# Patient Record
Sex: MALE | Race: OTHER | Employment: FULL TIME
[De-identification: names, ages, dates, MRNs, and addresses within clinical notes are randomized per-mention and may not be internally consistent; named-entity substitution may affect disease eponyms.]

---

## 2022-10-27 ENCOUNTER — OFFICE VISIT (OUTPATIENT)
Dept: FAMILY MEDICINE CLINIC | Facility: CLINIC | Age: 52
End: 2022-10-27

## 2022-10-27 VITALS
RESPIRATION RATE: 16 BRPM | OXYGEN SATURATION: 98 % | TEMPERATURE: 96.8 F | SYSTOLIC BLOOD PRESSURE: 110 MMHG | WEIGHT: 192 LBS | DIASTOLIC BLOOD PRESSURE: 68 MMHG | HEIGHT: 67 IN | BODY MASS INDEX: 30.13 KG/M2 | HEART RATE: 82 BPM

## 2022-10-27 DIAGNOSIS — E78.00 ELEVATED LDL CHOLESTEROL LEVEL: ICD-10-CM

## 2022-10-27 DIAGNOSIS — R73.09 ELEVATED RANDOM BLOOD GLUCOSE LEVEL: Primary | ICD-10-CM

## 2022-10-27 DIAGNOSIS — R71.8 ELEVATED RED BLOOD CELL COUNT: ICD-10-CM

## 2022-10-27 LAB
BASOPHILS # BLD: 0.1 K/UL (ref 0–0.2)
BASOPHILS NFR BLD: 2 % (ref 0–2)
DIFFERENTIAL METHOD BLD: ABNORMAL
EOSINOPHIL # BLD: 0.2 K/UL (ref 0–0.8)
EOSINOPHIL NFR BLD: 3 % (ref 0.5–7.8)
ERYTHROCYTE [DISTWIDTH] IN BLOOD BY AUTOMATED COUNT: 19 % (ref 11.9–14.6)
HCT VFR BLD AUTO: 42.5 % (ref 41.1–50.3)
HGB BLD-MCNC: 11.9 G/DL (ref 13.6–17.2)
IMM GRANULOCYTES # BLD AUTO: 0.1 K/UL (ref 0–0.5)
IMM GRANULOCYTES NFR BLD AUTO: 1 % (ref 0–5)
LYMPHOCYTES # BLD: 1.4 K/UL (ref 0.5–4.6)
LYMPHOCYTES NFR BLD: 24 % (ref 13–44)
MCH RBC QN AUTO: 19.2 PG (ref 26.1–32.9)
MCHC RBC AUTO-ENTMCNC: 28 G/DL (ref 31.4–35)
MCV RBC AUTO: 68.5 FL (ref 82–102)
MONOCYTES # BLD: 0.3 K/UL (ref 0.1–1.3)
MONOCYTES NFR BLD: 6 % (ref 4–12)
NEUTS SEG # BLD: 3.9 K/UL (ref 1.7–8.2)
NEUTS SEG NFR BLD: 66 % (ref 43–78)
NRBC # BLD: 0 K/UL (ref 0–0.2)
PLATELET # BLD AUTO: 628 K/UL (ref 150–450)
PMV BLD AUTO: 9.6 FL (ref 9.4–12.3)
RBC # BLD AUTO: 6.2 M/UL (ref 4.23–5.6)
WBC # BLD AUTO: 5.9 K/UL (ref 4.3–11.1)

## 2022-10-27 PROCEDURE — 99213 OFFICE O/P EST LOW 20 MIN: CPT | Performed by: NURSE PRACTITIONER

## 2022-10-27 ASSESSMENT — ENCOUNTER SYMPTOMS
SHORTNESS OF BREATH: 0
DIARRHEA: 0
COUGH: 0
CONSTIPATION: 0
WHEEZING: 0
VOMITING: 0
EYE DISCHARGE: 0
EYE PAIN: 0
BLOOD IN STOOL: 0
ABDOMINAL PAIN: 0
CHEST TIGHTNESS: 0
RHINORRHEA: 0

## 2022-10-27 ASSESSMENT — PATIENT HEALTH QUESTIONNAIRE - PHQ9
SUM OF ALL RESPONSES TO PHQ QUESTIONS 1-9: 0
SUM OF ALL RESPONSES TO PHQ QUESTIONS 1-9: 0
1. LITTLE INTEREST OR PLEASURE IN DOING THINGS: 0
SUM OF ALL RESPONSES TO PHQ QUESTIONS 1-9: 0
SUM OF ALL RESPONSES TO PHQ QUESTIONS 1-9: 0
2. FEELING DOWN, DEPRESSED OR HOPELESS: 0
SUM OF ALL RESPONSES TO PHQ9 QUESTIONS 1 & 2: 0

## 2022-10-27 NOTE — PROGRESS NOTES
Oneil Patel (:  1970) is a 45 y.o. male,Established patient, here for evaluation of the following chief complaint(s):  Depression and Labs Only         ASSESSMENT/PLAN:  1. Elevated random blood glucose level  -     Hemoglobin A1C; Future  2. Elevated LDL cholesterol level  -     Lipid Panel; Future  -     Comprehensive Metabolic Panel; Future  3. Elevated red blood cell count  -     CBC with Auto Differential; Future    Anticipatory guidance for age-seatbelts, avoid cell phone use in car (may use hands free), no texting while driving, avoid social drugs and tobacco, limit alcohol, fall safety, personal safety. Encourage healthy diet and exercise daily. Patient states he is eating well, healthy diet. Tries to make healthy choices. Return in about 6 months (around 2023) for labs and office visit. Subjective   SUBJECTIVE/OBJECTIVE:  Here today for his lab work. Needs to have everything tested as he is now donating blood every two months to keep his RBCs down. Works long hours framing houses. No formal exercise plan. Is watching diet. Is afraid he will have diabetes one day as it runs in his family. English is his seconPicmonic language but is doing well today without the --states he needs to practice his English more. Brother also with polycythemia. Denies any clotting issues. No bruising. No abdominal pain. No Known Allergies  No current outpatient medications on file. No current facility-administered medications for this visit. Review of Systems   Constitutional:  Negative for fatigue and fever. HENT:  Negative for congestion, hearing loss, postnasal drip and rhinorrhea. Eyes:  Negative for pain, discharge and visual disturbance. Respiratory:  Negative for cough, chest tightness, shortness of breath and wheezing. Cardiovascular:  Negative for chest pain, palpitations and leg swelling.    Gastrointestinal:  Negative for abdominal pain, blood in stool, constipation, diarrhea and vomiting. Endocrine: Negative for polydipsia, polyphagia and polyuria. Polycythemia. Is donating blood. Has not been turned down by [facility]. Goes every two months. Denies any signs of diabetes. Genitourinary:  Negative for difficulty urinating, frequency and urgency. Musculoskeletal:  Negative for arthralgias, gait problem and myalgias. Has very physical job--denies joint pain or myalgias. Skin:  Negative for rash. Neurological:  Negative for dizziness, tremors, seizures and headaches. Psychiatric/Behavioral:  Negative for decreased concentration and sleep disturbance. The patient is not nervous/anxious. /68   Pulse 82   Temp 96.8 °F (36 °C) (Tympanic)   Resp 16   Ht 5' 7\" (1.702 m)   Wt 192 lb (87.1 kg)   SpO2 98%   BMI 30.07 kg/m²       Objective   Physical Exam  Constitutional:       Appearance: Normal appearance. HENT:      Head: Normocephalic and atraumatic. Right Ear: Tympanic membrane, ear canal and external ear normal.      Left Ear: Tympanic membrane, ear canal and external ear normal.      Nose: Nose normal.      Mouth/Throat:      Mouth: Mucous membranes are moist.   Eyes:      Extraocular Movements: Extraocular movements intact. Conjunctiva/sclera: Conjunctivae normal.      Pupils: Pupils are equal, round, and reactive to light. Cardiovascular:      Rate and Rhythm: Normal rate and regular rhythm. Pulses: Normal pulses. Heart sounds: Normal heart sounds. No murmur heard. Pulmonary:      Effort: Pulmonary effort is normal.      Breath sounds: Normal breath sounds. Abdominal:      General: Bowel sounds are normal.   Musculoskeletal:         General: Normal range of motion. Cervical back: Normal range of motion. Skin:     General: Skin is warm and dry. Neurological:      General: No focal deficit present.       Mental Status: He is alert and oriented to person, place, and time.   Psychiatric:         Mood and Affect: Mood normal.         Behavior: Behavior normal.         Thought Content: Thought content normal.         Judgment: Judgment normal.      No data recorded  Body mass index is 30.07 kg/m². On this date 10/27/2022 I have spent 25 minutes reviewing previous notes, test results and face to face with the patient discussing the diagnosis and importance of compliance with the treatment plan as well as documenting on the day of the visit. An electronic signature was used to authenticate this note.     --RIOS Araujo - CNP

## 2022-10-28 LAB
ALBUMIN SERPL-MCNC: 4.1 G/DL (ref 3.5–5)
ALBUMIN/GLOB SERPL: 1.4 {RATIO} (ref 0.4–1.6)
ALP SERPL-CCNC: 77 U/L (ref 50–136)
ALT SERPL-CCNC: 34 U/L (ref 12–65)
ANION GAP SERPL CALC-SCNC: 6 MMOL/L (ref 2–11)
AST SERPL-CCNC: 20 U/L (ref 15–37)
BILIRUB SERPL-MCNC: 0.5 MG/DL (ref 0.2–1.1)
BUN SERPL-MCNC: 14 MG/DL (ref 6–23)
CALCIUM SERPL-MCNC: 9.3 MG/DL (ref 8.3–10.4)
CHLORIDE SERPL-SCNC: 107 MMOL/L (ref 101–110)
CHOLEST SERPL-MCNC: 159 MG/DL
CO2 SERPL-SCNC: 26 MMOL/L (ref 21–32)
CREAT SERPL-MCNC: 0.8 MG/DL (ref 0.8–1.5)
EST. AVERAGE GLUCOSE BLD GHB EST-MCNC: 117 MG/DL
GLOBULIN SER CALC-MCNC: 3 G/DL (ref 2.8–4.5)
GLUCOSE SERPL-MCNC: 96 MG/DL (ref 65–100)
HBA1C MFR BLD: 5.7 % (ref 4.8–5.6)
HDLC SERPL-MCNC: 45 MG/DL (ref 40–60)
HDLC SERPL: 3.5 {RATIO}
LDLC SERPL CALC-MCNC: 85.6 MG/DL
POTASSIUM SERPL-SCNC: 4.4 MMOL/L (ref 3.5–5.1)
PROT SERPL-MCNC: 7.1 G/DL (ref 6.3–8.2)
SODIUM SERPL-SCNC: 139 MMOL/L (ref 133–143)
TRIGL SERPL-MCNC: 142 MG/DL (ref 35–150)
VLDLC SERPL CALC-MCNC: 28.4 MG/DL (ref 6–23)

## 2022-11-01 NOTE — PROGRESS NOTES
Referred to hematologist.  Would like to just make sure that there is no other treatment needed for his polycythemia.   Cecily Beltran

## 2022-12-02 ENCOUNTER — HOSPITAL ENCOUNTER (OUTPATIENT)
Dept: LAB | Age: 52
Discharge: HOME | End: 2022-12-02

## 2022-12-02 ENCOUNTER — OFFICE VISIT (OUTPATIENT)
Dept: ONCOLOGY | Age: 52
End: 2022-12-02

## 2022-12-02 VITALS
DIASTOLIC BLOOD PRESSURE: 91 MMHG | SYSTOLIC BLOOD PRESSURE: 142 MMHG | HEART RATE: 85 BPM | BODY MASS INDEX: 30.49 KG/M2 | RESPIRATION RATE: 16 BRPM | HEIGHT: 67 IN | OXYGEN SATURATION: 98 % | TEMPERATURE: 98.8 F | WEIGHT: 194.3 LBS

## 2022-12-02 DIAGNOSIS — R71.8 ELEVATED RED BLOOD CELL COUNT: ICD-10-CM

## 2022-12-02 DIAGNOSIS — R71.8 ELEVATED RED BLOOD CELL COUNT: Primary | ICD-10-CM

## 2022-12-02 LAB
ALBUMIN SERPL-MCNC: 4.2 G/DL (ref 3.5–5)
ALBUMIN/GLOB SERPL: 1.2 {RATIO} (ref 0.4–1.6)
ALP SERPL-CCNC: 83 U/L (ref 50–136)
ALT SERPL-CCNC: 47 U/L (ref 12–65)
ANION GAP SERPL CALC-SCNC: 6 MMOL/L (ref 2–11)
AST SERPL-CCNC: 24 U/L (ref 15–37)
BASOPHILS # BLD: 0.1 K/UL (ref 0–0.2)
BASOPHILS NFR BLD: 1 % (ref 0–2)
BILIRUB SERPL-MCNC: 0.5 MG/DL (ref 0.2–1.1)
BUN SERPL-MCNC: 16 MG/DL (ref 6–23)
CALCIUM SERPL-MCNC: 8.9 MG/DL (ref 8.3–10.4)
CHLORIDE SERPL-SCNC: 106 MMOL/L (ref 101–110)
CO2 SERPL-SCNC: 27 MMOL/L (ref 21–32)
CREAT SERPL-MCNC: 0.9 MG/DL (ref 0.8–1.5)
DIFFERENTIAL METHOD BLD: ABNORMAL
EOSINOPHIL # BLD: 0.2 K/UL (ref 0–0.8)
EOSINOPHIL NFR BLD: 2 % (ref 0.5–7.8)
ERYTHROCYTE [DISTWIDTH] IN BLOOD BY AUTOMATED COUNT: 19.4 % (ref 11.9–14.6)
FERRITIN SERPL-MCNC: 4 NG/ML (ref 8–388)
FOLATE SERPL-MCNC: 17 NG/ML (ref 3.1–17.5)
GLOBULIN SER CALC-MCNC: 3.4 G/DL (ref 2.8–4.5)
GLUCOSE SERPL-MCNC: 100 MG/DL (ref 65–100)
HCT VFR BLD AUTO: 40 %
HGB BLD-MCNC: 11.6 G/DL (ref 13.6–17.2)
IMM GRANULOCYTES # BLD AUTO: 0.1 K/UL (ref 0–0.5)
IMM GRANULOCYTES NFR BLD AUTO: 1 % (ref 0–5)
IRON SATN MFR SERPL: 3 %
IRON SERPL-MCNC: 13 UG/DL (ref 35–150)
LYMPHOCYTES # BLD: 1.7 K/UL (ref 0.5–4.6)
LYMPHOCYTES NFR BLD: 21 % (ref 13–44)
Lab: NORMAL
Lab: NORMAL
MCH RBC QN AUTO: 18.9 PG (ref 26.1–32.9)
MCHC RBC AUTO-ENTMCNC: 29 G/DL (ref 31.4–35)
MCV RBC AUTO: 65.1 FL (ref 82–102)
MONOCYTES # BLD: 0.4 K/UL (ref 0.1–1.3)
MONOCYTES NFR BLD: 5 % (ref 4–12)
NEUTS SEG # BLD: 5.9 K/UL (ref 1.7–8.2)
NEUTS SEG NFR BLD: 70 % (ref 43–78)
NRBC # BLD: 0 K/UL (ref 0–0.2)
PLATELET # BLD AUTO: 677 K/UL (ref 150–450)
PMV BLD AUTO: 8.7 FL (ref 9.4–12.3)
POTASSIUM SERPL-SCNC: 4 MMOL/L (ref 3.5–5.1)
PROT SERPL-MCNC: 7.6 G/DL (ref 6.3–8.2)
RBC # BLD AUTO: 6.14 M/UL (ref 4.23–5.6)
REFERENCE LAB: NORMAL
SODIUM SERPL-SCNC: 139 MMOL/L (ref 133–143)
TIBC SERPL-MCNC: 517 UG/DL (ref 250–450)
VIT B12 SERPL-MCNC: 954 PG/ML (ref 193–986)
WBC # BLD AUTO: 8.4 K/UL (ref 4.3–11.1)

## 2022-12-02 PROCEDURE — 80053 COMPREHEN METABOLIC PANEL: CPT

## 2022-12-02 PROCEDURE — 82728 ASSAY OF FERRITIN: CPT

## 2022-12-02 PROCEDURE — 86235 NUCLEAR ANTIGEN ANTIBODY: CPT

## 2022-12-02 PROCEDURE — 81256 HFE GENE: CPT

## 2022-12-02 PROCEDURE — 83550 IRON BINDING TEST: CPT

## 2022-12-02 PROCEDURE — 86038 ANTINUCLEAR ANTIBODIES: CPT

## 2022-12-02 PROCEDURE — 82746 ASSAY OF FOLIC ACID SERUM: CPT

## 2022-12-02 PROCEDURE — 36415 COLL VENOUS BLD VENIPUNCTURE: CPT

## 2022-12-02 PROCEDURE — 82607 VITAMIN B-12: CPT

## 2022-12-02 PROCEDURE — 85025 COMPLETE CBC W/AUTO DIFF WBC: CPT

## 2022-12-02 PROCEDURE — 99205 OFFICE O/P NEW HI 60 MIN: CPT | Performed by: INTERNAL MEDICINE

## 2022-12-02 ASSESSMENT — PATIENT HEALTH QUESTIONNAIRE - PHQ9
SUM OF ALL RESPONSES TO PHQ QUESTIONS 1-9: 0
SUM OF ALL RESPONSES TO PHQ9 QUESTIONS 1 & 2: 0
SUM OF ALL RESPONSES TO PHQ QUESTIONS 1-9: 0
SUM OF ALL RESPONSES TO PHQ QUESTIONS 1-9: 0
1. LITTLE INTEREST OR PLEASURE IN DOING THINGS: 0
SUM OF ALL RESPONSES TO PHQ QUESTIONS 1-9: 0
2. FEELING DOWN, DEPRESSED OR HOPELESS: 0

## 2022-12-02 NOTE — PATIENT INSTRUCTIONS
Patient Instructions from Today's Visit    Reason for Visit:  New patient for elevated red blood cells. Plan:  Lab work today. Follow Up: One month with labs. Recent Lab Results:  N/a    Treatment Summary has been discussed and given to patient: n/a        -------------------------------------------------------------------------------------------------------------------  Please call our office at [phone] if you have any  of the following symptoms:   Fever of 100.5 or greater  Chills  Shortness of breath  Swelling or pain in one leg    After office hours an answering service is available and will contact a provider for emergencies or if you are experiencing any of the above symptoms. Patient did express an interest in My Chart. My Chart log in information explained on the after visit summary printout at the [facility] [address] desk.     Leia Velasquez RN

## 2022-12-02 NOTE — PROGRESS NOTES
New Patient Abstract    Reason for Referral: Elevated red blood cell count    Referring Provider:  RIOS Santiago CNP    Primary Care Provider: RIOS Santiago CNP    Family History of Cancer/Hematologic Disorders: Family history is significant for patient's brother with polycythemia. Presenting Symptoms: No relevant physical symptoms reported. Narrative with recent with Results/Procedures/Biopsies and Dates completed: Mr. Chago Marino is a 78-year-old  male with a history of rheumatoid arthritis, hyperkalemia, WILI, depression, elevated blood sugar, elevated cholesterol, and lipoma removal. He also reports a diagnosis of hemochromatosis for which he donates blood every 2 months. He was most recently seen by his PCP for routine follow-up on 10/27/22. Review of systems and physical exam were unremarkable. Labs drawn the same day were significant for RBC 6.20, HGB 11.9, MCV 68.5, MCH 19.2, MCHC 28.0, RDW 19.0, and . Patient reports that his brother also has polycythemia. He denied any clotting issues, bruising, or abdominal pain. He continues to donate blood at the [facility] every 2 months. After review of labs, PCP noted, Referred to hematologist. Would like to just make sure that there is no other treatment needed for his polycythemia.      3/19/21 09:23 10/26/21 10:20 4/27/22 08:25 10/27/22 09:09   WBC 7.7 7.6 8.1 5.9   RBC 6.52 (H) 6.59 (H) 6.55 (H) 6.20 (H)   Hemoglobin Quant 12.7 (L) 12.4 (L) 12.2 (L) 11.9 (L)   Hematocrit 42.7 42.8 42.5 42.5   MCV 66 (L) 65 (L) 65 (L) 68.5 (L)   MCH 19.5 (L) 18.8 (L) 18.6 (L) 19.2 (L)   MCHC 29.7 (L) 29.0 (L) 28.7 (L) 28.0 (L)   MPV    9.6   RDW 18.4 (H) 19.3 (H) 18.9 (H) 19.0 (H)   Platelet Count 023 (H) 614 (H) 563 (H) 628 (H)   Neutrophils % 65 67 69    Lymphocyte % 23 22 21    Absolute Mono #    0.3   Monocytes % 6 7 6    Eosinophils % 3 2 2 3   Basophils % 2 1 1    Neutrophils Absolute 5.0 5.1 5.5    Lymphocytes Absolute 1.8 1.7 1.7    Monocytes Absolute 0.5 0.5 0.5    Eosinophils Absolute 0.2 0.2 0.2    Basophils Absolute 0.1 0.1 0.1 0.1   Differential Type    AUTOMATED   Seg Neutrophils    66   Granulocyte Absolute Count 0.1 0.1 0.1    Segs Absolute    3.9   Lymphocytes    24   Absolute Lymph #    1.4   Monocytes    6   Absolute Eos #    0.2   Basophils    2   Immature Granulocytes 1 1 1 1   Nucleated Red Blood Cells    0.00   Absolute Immature Granulocyte    0.1      3/19/21 09:23 4/27/22 08:25 10/27/22 09:09   Sodium 140 140 139   Potassium 5.1 4.9 4.4   Chloride 104 104 107   CO2 21 22 26   BUN,BUNPL 18 15 14   Creatinine 0.84 0.87 0.80   Bun/Cre Ratio 21 (H) 17    Anion Gap   6   Est, Glom Filt Rate   >60   EGFR IF NonAfrican American 100     GFR African American 116     Glucose, Random 100 (H) 97 96   CALCIUM, SERUM, 690203 9.7 9.4 9.3   ALBUMIN/GLOBULIN RATIO   1.4   Total Protein 7.3 6.6 7.1   Globulin, Total 2.6 2.0    EGFR  103    Chol/HDL Ratio   3.5   CHOLESTEROL, TOTAL, 878177 198 182 159   HDL Cholesterol 46 49 45   LDL Calculated 113 (H) 112 (H) 85.6   LDL/HDL Ratio 2.5 2.3    Triglycerides 223 (H) 119 142   VLDL 39 21    VLDL Cholesterol Calculated   28.4 (H)   Albumin 4.7 4.6 4.1   Globulin   3.0   Albumin/Globulin Ratio 1.8 2.3 (H)    Alk Phos 81 74    Alk Phosphatase   77   ALT 23 18 34   AST 20 21 20   Bilirubin 0.4 0.6 0.5   Hemoglobin A1C  5.8 (H) 5.7 (H)   eAG (mg/dL)  120 117   TSH 1.060 1.020      Notes from Referring Provider: None    Other Pertinent Information: None    Presented at Tumor Board: N/A

## 2022-12-02 NOTE — PROGRESS NOTES
[facility] Hematology and Oncology: New Patient - Consultation    Chief Complaint   Patient presents with    New Patient     Reason for Referral: Elevated red blood cell count  Referring Provider:  RIOS Mendoza CNP  Family History of Cancer/Hematologic Disorders: Family history is significant for patient's brother with polycythemia. Presenting Symptoms: No relevant physical symptoms reported. History of Present Illness:  Mr. Julián Galvin is a 45 y.o. male who presents today in referral from [facility] for consultation regarding polycythemia. The past medical history is significant for rheumatoid arthritis, hyperkalemia, WILI, depression, elevated blood sugar, elevated cholesterol, and lipoma removal.  He reported a diagnosis of hemochromatosis to PCP for which he donates blood every 2 months. He was most recently seen by his PCP for routine follow-up on 10/27/22. Review of systems and physical exam were unremarkable. Labs from same day: RBC 6.20, HGB 11.9, MCV 68.5, MCH 19.2, MCHC 28.0, RDW 19.0, and . Patient reported to PCP that his brother also has polycythemia. He denied any clotting issues, bruising, or abdominal pain. He continues to donate blood at the [facility] every 2 months. After review of labs, PCP noted, Referred to hematologist. Would like to just make sure that there is no other treatment needed for his polycythemia.     Today, he is here with his daughter. Pt is not sure of his diagnosis. He does not know if he has PV or hemochromatosis. Daughter is here with him, and she does not know etiology of his original polycythemia. Pt has been donating blood leading to microcytic anemia. Will need to complete further workup before final recs are made. I recommend holding off on phlebotomy at this time, he reported episodes of dizziness and fatigue. He wishes to p/w further workup at this time.       Chronological Events:       3/19/21 09:23 10/26/21 10:20 4/27/22 08:25 10/27/22 09:09   WBC 7.7 7.6 8.1 5.9   RBC 6.52 (H) 6.59 (H) 6.55 (H) 6.20 (H)   Hemoglobin Quant 12.7 (L) 12.4 (L) 12.2 (L) 11.9 (L)   Hematocrit 42.7 42.8 42.5 42.5   MCV 66 (L) 65 (L) 65 (L) 68.5 (L)   MCH 19.5 (L) 18.8 (L) 18.6 (L) 19.2 (L)   MCHC 29.7 (L) 29.0 (L) 28.7 (L) 28.0 (L)   MPV       9.6   RDW 18.4 (H) 19.3 (H) 18.9 (H) 19.0 (H)   Platelet Count 625 (H) 614 (H) 563 (H) 628 (H)   Neutrophils % 65 67 69     Lymphocyte % 23 22 21     Absolute Mono #       0.3   Monocytes % 6 7 6     Eosinophils % 3 2 2 3   Basophils % 2 1 1     Neutrophils Absolute 5.0 5.1 5.5     Lymphocytes Absolute 1.8 1.7 1.7     Monocytes Absolute 0.5 0.5 0.5     Eosinophils Absolute 0.2 0.2 0.2     Basophils Absolute 0.1 0.1 0.1 0.1   Differential Type       AUTOMATED   Seg Neutrophils       66   Granulocyte Absolute Count 0.1 0.1 0.1     Segs Absolute       3.9   Lymphocytes       24   Absolute Lymph #       1.4   Monocytes       6   Absolute Eos #       0.2   Basophils       2   Immature Granulocytes 1 1 1 1   Nucleated Red Blood Cells       0.00   Absolute Immature Granulocyte       0.1        3/19/21 09:23 4/27/22 08:25 10/27/22 09:09   Sodium 140 140 139   Potassium 5.1 4.9 4.4   Chloride 104 104 107   CO2 21 22 26   BUN,BUNPL 18 15 14   Creatinine 0.84 0.87 0.80   Bun/Cre Ratio 21 (H) 17     Anion Gap     6   Est, Glom Filt Rate     >60   EGFR IF NonAfrican American 100       GFR African American 116       Glucose, Random 100 (H) 97 96   CALCIUM, SERUM, 749110 9.7 9.4 9.3   ALBUMIN/GLOBULIN RATIO     1.4   Total Protein 7.3 6.6 7.1   Globulin, Total 2.6 2.0     EGFR   103     Chol/HDL Ratio     3.5   CHOLESTEROL, TOTAL, 692466 198 182 159   HDL Cholesterol 46 49 45   LDL Calculated 113 (H) 112 (H) 85.6   LDL/HDL Ratio 2.5 2.3     Triglycerides 223 (H) 119 142   VLDL 39 21     VLDL Cholesterol Calculated     28.4 (H)   Albumin 4.7 4.6 4.1   Globulin     3.0   Albumin/Globulin Ratio 1.8 2.3 (H)     Alk Phos 81 74     Alk Phosphatase     77   ALT 23 18 34   AST 20 21 20   Bilirubin 0.4 0.6 0.5   Hemoglobin A1C   5.8 (H) 5.7 (H)   eAG (mg/dL)   120 117   TSH 1.060 1.020        12/2/22 heme/onc consultation     Family History   Problem Relation Age of Onset    Lupus Mother     Other Mother         lupus      Social History     Socioeconomic History    Marital status:      Spouse name: None    Number of children: None    Years of education: None    Highest education level: None   Tobacco Use    Smoking status: Never    Smokeless tobacco: Never   Substance and Sexual Activity    Alcohol use: Yes    Drug use: No      Review of Systems   Constitutional:  Positive for fatigue. Negative for appetite change, diaphoresis, fever and unexpected weight change. HENT:   Negative for sore throat. Eyes:  Negative for eye problems and icterus. Respiratory:  Negative for cough, hemoptysis and shortness of breath. Cardiovascular:  Negative for chest pain, leg swelling and palpitations. Gastrointestinal:  Negative for abdominal distention, abdominal pain, blood in stool, constipation, diarrhea, nausea and vomiting. Endocrine: Negative for hot flashes. Genitourinary:  Negative for dysuria. Musculoskeletal:  Negative for arthralgias, back pain and gait problem. Skin:  Negative for itching, rash and wound. Neurological:  Positive for light-headedness. Negative for dizziness, extremity weakness, gait problem, headaches, numbness, seizures and speech difficulty. Hematological:  Negative for adenopathy. Does not bruise/bleed easily. Psychiatric/Behavioral:  Negative for decreased concentration, depression and sleep disturbance. The patient is not nervous/anxious.        No Known Allergies  Past Medical History:   Diagnosis Date    H/O hyperkalemia 4/30/2014    Rheumatoid arteritis ([facility] [address] 4/30/2014     Past Surgical History:   Procedure Laterality Date    OTHER SURGICAL HISTORY      Lipoma removal     No current outpatient medications on file. No current facility-administered medications for this visit. No flowsheet data found. OBJECTIVE:  BP (!) 142/91   Pulse 85   Temp 98.8 °F (37.1 °C) (Oral)   Resp 16   Ht 5' 7.32\" (1.71 m)   Wt 194 lb 4.8 oz (88.1 kg)   SpO2 98%   BMI 30.14 kg/m²       ECOG PERFORMANCE STATUS - 0-Fully active, able to carry on all pre-disease performance without restriction. Works outside     Pain - 0 - No pain/10. None/Minimal pain - not affecting QOL     Fatigue - No flowsheet data found. Distress - No flowsheet data found. Physical Exam  Vitals reviewed. Exam conducted with a chaperone present. Constitutional:       General: He is not in acute distress. Appearance: Normal appearance. He is not ill-appearing. HENT:      Head: Normocephalic and atraumatic. Nose: Nose normal. No congestion. Mouth/Throat:      Mouth: Mucous membranes are moist.   Eyes:      General: No scleral icterus. Extraocular Movements: Extraocular movements intact. Conjunctiva/sclera: Conjunctivae normal.      Pupils: Pupils are equal, round, and reactive to light. Cardiovascular:      Rate and Rhythm: Normal rate and regular rhythm. Heart sounds: No murmur heard. Pulmonary:      Effort: Pulmonary effort is normal. No respiratory distress. Breath sounds: Normal breath sounds. No wheezing, rhonchi or rales. Abdominal:      General: There is no distension. Palpations: Abdomen is soft. There is no mass. Tenderness: There is no abdominal tenderness. There is no guarding or rebound. Musculoskeletal:         General: Normal range of motion. Cervical back: Normal range of motion. No rigidity. Right lower leg: No edema. Left lower leg: No edema. Lymphadenopathy:      Cervical: No cervical adenopathy. Upper Body:      Right upper body: No supraclavicular or axillary adenopathy. Left upper body: No supraclavicular or axillary adenopathy. Skin:     General: Skin is warm and dry. Coloration: Skin is not jaundiced or pale. Findings: No bruising or rash. Comments: Sun damage - works outside/landscaping    Neurological:      General: No focal deficit present. Mental Status: He is alert and oriented to person, place, and time. Motor: No weakness. Coordination: Coordination normal.      Gait: Gait normal.   Psychiatric:         Behavior: Behavior normal.         Thought Content: Thought content normal.        Labs:  No results found for this or any previous visit (from the past 168 hour(s)). Imaging: reviewed       ASSESSMENT:     Diagnosis Orders   1. Elevated red blood cell count  Comprehensive Metabolic Panel    CBC with Auto Differential    Ferritin    Folate    Vitamin B12    Transferrin Saturation    MANJINDER, Direct, w/Reflex    Hemochromatosis mutation    [address] [facility]          Mr. Ravinder John is here for evaluation of elevated rbc. Polycythemia   - during today's visit, we discussed the pathophysiology of hematopoesis in general.  We reviewed DDX for polycythemia. He is not sure of his dx. He was unaware of hemochromatosis or PV.     - will p/w labs to establish etiology   - due to fatigue and lightheadedness, rec stopping phlebotomy at this time - currently w microcytic anemia due to iron defic.   - daughter here with pt, also unaware of previous dx   - plan for cbc/cmp, Po w reflex, HFE mut, MANJINDER   - pt snores per daughter - will refer for sleep study to r/o LUZ MARIA as cause of polycythemia     RESUSCITATION DIRECTIVES/HOSPICE CARE: Full Support    RTC after labs resulted to discuss   [60min - chart review, review of results and discussion of options, next steps, coordination of care and charting ]      MDM  Number of Diagnoses or Management Options  Elevated red blood cell count: new, needed workup     Amount and/or Complexity of Data Reviewed  Clinical lab tests: ordered and reviewed  Obtain history from someone other than the patient: yes  Review and summarize past medical records: yes  Independent visualization of images, tracings, or specimens: yes    Risk of Complications, Morbidity, and/or Mortality  Presenting problems: moderate  Diagnostic procedures: moderate  Management options: high        Lab studies and imaging studies were personally reviewed. Pertinent old records were reviewed. All questions were asked and answered to the best of my ability. The patient verbalized understanding and agrees with the plan above. Documentation was sent to RAMIRO Griffith for continuation of care.   Thank you, RAMIRO Griffith, for the courtesy of this referral.        [place] Leonore Jacobus) Karyle Angel [address] [place] [address] Hematology and Oncology  [address] [place] [address]  Office : [phone]  Fax : [phone]

## 2022-12-03 LAB
ANA SER QL: POSITIVE
CENTROMERE B AB SER-ACNC: <0.2 AI (ref 0–0.9)
CHROMATIN AB SERPL-ACNC: <0.2 AI (ref 0–0.9)
DSDNA AB SER-ACNC: <1 IU/ML (ref 0–9)
ENA JO1 AB SER-ACNC: <0.2 AI (ref 0–0.9)
ENA RNP AB SER-ACNC: 0.7 AI (ref 0–0.9)
ENA SCL70 AB SER-ACNC: 0.7 AI (ref 0–0.9)
ENA SM AB SER-ACNC: <0.2 AI (ref 0–0.9)
ENA SS-A AB SER-ACNC: >8 AI (ref 0–0.9)
ENA SS-B AB SER-ACNC: <0.2 AI (ref 0–0.9)
Lab: ABNORMAL

## 2022-12-07 ENCOUNTER — HOSPITAL ENCOUNTER (OUTPATIENT)
Dept: SLEEP MEDICINE | Age: 52
Discharge: HOME | End: 2022-12-10

## 2022-12-16 ASSESSMENT — ENCOUNTER SYMPTOMS
SORE THROAT: 0
EYE PROBLEMS: 0
ABDOMINAL PAIN: 0
BLOOD IN STOOL: 0
CONSTIPATION: 0
SCLERAL ICTERUS: 0
DIARRHEA: 0
VOMITING: 0
SHORTNESS OF BREATH: 0
COUGH: 0
HEMOPTYSIS: 0
NAUSEA: 0
BACK PAIN: 0
ABDOMINAL DISTENTION: 0

## 2023-01-20 DIAGNOSIS — E61.1 IRON DEFICIENCY: Primary | ICD-10-CM

## 2023-01-23 ENCOUNTER — CLINICAL DOCUMENTATION (OUTPATIENT)
Dept: ONCOLOGY | Age: 53
End: 2023-01-23

## 2023-01-25 ENCOUNTER — HOSPITAL ENCOUNTER (OUTPATIENT)
Dept: SLEEP CENTER | Age: 53
Discharge: HOME | End: 2023-01-28

## 2023-01-25 PROCEDURE — 95810 POLYSOM 6/> YRS 4/> PARAM: CPT

## 2023-03-17 ENCOUNTER — TELEPHONE (OUTPATIENT)
Dept: ONCOLOGY | Age: 53
End: 2023-03-17

## 2023-03-24 ENCOUNTER — TELEPHONE (OUTPATIENT)
Dept: ONCOLOGY | Age: 53
End: 2023-03-24

## 2023-03-27 ENCOUNTER — CLINICAL DOCUMENTATION (OUTPATIENT)
Dept: ONCOLOGY | Age: 53
End: 2023-03-27

## 2023-03-27 NOTE — PROGRESS NOTES
Attempted to call patient with  services on the line per patient request to review lab results. No answer. Will attempt to call patient with  services again.

## 2023-03-31 DIAGNOSIS — D50.9 IRON DEFICIENCY ANEMIA, UNSPECIFIED IRON DEFICIENCY ANEMIA TYPE: ICD-10-CM

## 2023-03-31 RX ORDER — SODIUM CHLORIDE 0.9 % (FLUSH) 0.9 %
5-40 SYRINGE (ML) INJECTION PRN
OUTPATIENT
Start: 2023-04-05

## 2023-03-31 RX ORDER — SODIUM CHLORIDE 9 MG/ML
5-250 INJECTION, SOLUTION INTRAVENOUS PRN
OUTPATIENT
Start: 2023-04-05

## 2023-03-31 RX ORDER — ONDANSETRON 2 MG/ML
8 INJECTION INTRAMUSCULAR; INTRAVENOUS
OUTPATIENT
Start: 2023-04-05

## 2023-03-31 RX ORDER — ACETAMINOPHEN 325 MG/1
650 TABLET ORAL
OUTPATIENT
Start: 2023-04-05

## 2023-03-31 RX ORDER — FAMOTIDINE 10 MG/ML
20 INJECTION, SOLUTION INTRAVENOUS
OUTPATIENT
Start: 2023-04-05

## 2023-03-31 RX ORDER — SODIUM CHLORIDE 9 MG/ML
INJECTION, SOLUTION INTRAVENOUS CONTINUOUS
OUTPATIENT
Start: 2023-04-05

## 2023-03-31 RX ORDER — HEPARIN SODIUM (PORCINE) LOCK FLUSH IV SOLN 100 UNIT/ML 100 UNIT/ML
500 SOLUTION INTRAVENOUS PRN
OUTPATIENT
Start: 2023-04-05

## 2023-03-31 RX ORDER — DIPHENHYDRAMINE HYDROCHLORIDE 50 MG/ML
50 INJECTION INTRAMUSCULAR; INTRAVENOUS
OUTPATIENT
Start: 2023-04-05

## 2023-03-31 RX ORDER — ALBUTEROL SULFATE 90 UG/1
4 AEROSOL, METERED RESPIRATORY (INHALATION) PRN
OUTPATIENT
Start: 2023-04-05

## 2023-03-31 RX ORDER — EPINEPHRINE 1 MG/ML
0.3 INJECTION, SOLUTION, CONCENTRATE INTRAVENOUS PRN
OUTPATIENT
Start: 2023-04-05

## 2023-04-11 ASSESSMENT — ENCOUNTER SYMPTOMS
EYE PROBLEMS: 0
SORE THROAT: 0
COUGH: 0
VOMITING: 0
ABDOMINAL PAIN: 0
CONSTIPATION: 0
BACK PAIN: 0
BLOOD IN STOOL: 0
ABDOMINAL DISTENTION: 0
NAUSEA: 0
SHORTNESS OF BREATH: 0
DIARRHEA: 0
HEMOPTYSIS: 0
SCLERAL ICTERUS: 0

## 2023-04-18 ENCOUNTER — TELEMEDICINE (OUTPATIENT)
Dept: ONCOLOGY | Age: 53
End: 2023-04-18

## 2023-04-18 DIAGNOSIS — D75.1 POLYCYTHEMIA: Primary | ICD-10-CM

## 2023-04-18 DIAGNOSIS — D50.9 IRON DEFICIENCY ANEMIA, UNSPECIFIED IRON DEFICIENCY ANEMIA TYPE: ICD-10-CM

## 2023-04-18 DIAGNOSIS — Z15.89 JAK2 V617F MUTATION: ICD-10-CM

## 2023-04-18 PROCEDURE — 99213 OFFICE O/P EST LOW 20 MIN: CPT | Performed by: INTERNAL MEDICINE

## 2023-04-18 ASSESSMENT — PATIENT HEALTH QUESTIONNAIRE - PHQ9
SUM OF ALL RESPONSES TO PHQ QUESTIONS 1-9: 0
2. FEELING DOWN, DEPRESSED OR HOPELESS: 0
SUM OF ALL RESPONSES TO PHQ9 QUESTIONS 1 & 2: 0
1. LITTLE INTEREST OR PLEASURE IN DOING THINGS: 0
SUM OF ALL RESPONSES TO PHQ QUESTIONS 1-9: 0

## 2023-04-19 ENCOUNTER — CLINICAL DOCUMENTATION (OUTPATIENT)
Dept: ONCOLOGY | Age: 53
End: 2023-04-19

## 2023-04-19 DIAGNOSIS — R71.8 ELEVATED RED BLOOD CELL COUNT: Primary | ICD-10-CM

## 2023-04-19 DIAGNOSIS — D64.9 ANEMIA, UNSPECIFIED TYPE: ICD-10-CM

## 2023-04-19 NOTE — PROGRESS NOTES
Call to patient with  services [facility] #[phone] to let him know we have scheduled him for a lab appointment tomorrow at 8am.  We will call him with any results, recommendations, and when Dr. Mary Beth Balbuena will see him next. Pt VU and appreciated the call.

## 2023-04-20 ENCOUNTER — HOSPITAL ENCOUNTER (OUTPATIENT)
Dept: LAB | Age: 53
Discharge: HOME | End: 2023-04-20

## 2023-04-20 DIAGNOSIS — E61.1 IRON DEFICIENCY: ICD-10-CM

## 2023-04-20 LAB
ALBUMIN SERPL-MCNC: 4 G/DL (ref 3.5–5)
ALBUMIN/GLOB SERPL: 1.1 (ref 0.4–1.6)
ALP SERPL-CCNC: 93 U/L (ref 50–136)
ALT SERPL-CCNC: 42 U/L (ref 12–65)
ANION GAP SERPL CALC-SCNC: 5 MMOL/L (ref 2–11)
AST SERPL-CCNC: 26 U/L (ref 15–37)
BASOPHILS # BLD: 0.1 K/UL (ref 0–0.2)
BASOPHILS NFR BLD: 1 % (ref 0–2)
BILIRUB SERPL-MCNC: 0.4 MG/DL (ref 0.2–1.1)
BUN SERPL-MCNC: 14 MG/DL (ref 6–23)
CALCIUM SERPL-MCNC: 9.1 MG/DL (ref 8.3–10.4)
CHLORIDE SERPL-SCNC: 110 MMOL/L (ref 101–110)
CO2 SERPL-SCNC: 26 MMOL/L (ref 21–32)
CREAT SERPL-MCNC: 0.9 MG/DL (ref 0.8–1.5)
DIFFERENTIAL METHOD BLD: ABNORMAL
EOSINOPHIL # BLD: 0.2 K/UL (ref 0–0.8)
EOSINOPHIL NFR BLD: 3 % (ref 0.5–7.8)
ERYTHROCYTE [DISTWIDTH] IN BLOOD BY AUTOMATED COUNT: 20.8 % (ref 11.9–14.6)
FERRITIN SERPL-MCNC: 5 NG/ML (ref 8–388)
GLOBULIN SER CALC-MCNC: 3.6 G/DL (ref 2.8–4.5)
GLUCOSE SERPL-MCNC: 114 MG/DL (ref 65–100)
HCT VFR BLD AUTO: 44.6 % (ref 41.1–50.3)
HGB BLD-MCNC: 12.7 G/DL (ref 13.6–17.2)
IMM GRANULOCYTES # BLD AUTO: 0.1 K/UL (ref 0–0.5)
IMM GRANULOCYTES NFR BLD AUTO: 1 % (ref 0–5)
IRON SATN MFR SERPL: 4 %
IRON SERPL-MCNC: 19 UG/DL (ref 35–150)
LYMPHOCYTES # BLD: 1.8 K/UL (ref 0.5–4.6)
LYMPHOCYTES NFR BLD: 24 % (ref 13–44)
MCH RBC QN AUTO: 18.6 PG (ref 26.1–32.9)
MCHC RBC AUTO-ENTMCNC: 28.5 G/DL (ref 31.4–35)
MCV RBC AUTO: 65.4 FL (ref 82–102)
MONOCYTES # BLD: 0.5 K/UL (ref 0.1–1.3)
MONOCYTES NFR BLD: 7 % (ref 4–12)
NEUTS SEG # BLD: 4.9 K/UL (ref 1.7–8.2)
NEUTS SEG NFR BLD: 64 % (ref 43–78)
NRBC # BLD: 0 K/UL (ref 0–0.2)
PLATELET # BLD AUTO: 610 K/UL (ref 150–450)
PMV BLD AUTO: 8.8 FL (ref 9.4–12.3)
POTASSIUM SERPL-SCNC: 4.6 MMOL/L (ref 3.5–5.1)
PROT SERPL-MCNC: 7.6 G/DL (ref 6.3–8.2)
RBC # BLD AUTO: 6.82 M/UL (ref 4.23–5.6)
SODIUM SERPL-SCNC: 141 MMOL/L (ref 133–143)
TIBC SERPL-MCNC: 498 UG/DL (ref 250–450)
WBC # BLD AUTO: 7.6 K/UL (ref 4.3–11.1)

## 2023-04-20 PROCEDURE — 85025 COMPLETE CBC W/AUTO DIFF WBC: CPT

## 2023-04-20 PROCEDURE — 80053 COMPREHEN METABOLIC PANEL: CPT

## 2023-04-20 PROCEDURE — 83540 ASSAY OF IRON: CPT

## 2023-04-20 PROCEDURE — 82728 ASSAY OF FERRITIN: CPT

## 2023-04-20 PROCEDURE — 36415 COLL VENOUS BLD VENIPUNCTURE: CPT

## 2023-04-20 PROCEDURE — 83550 IRON BINDING TEST: CPT

## 2023-04-21 ENCOUNTER — CLINICAL DOCUMENTATION (OUTPATIENT)
Dept: ONCOLOGY | Age: 53
End: 2023-04-21

## 2023-04-21 DIAGNOSIS — R71.8 ELEVATED RED BLOOD CELL COUNT: Primary | ICD-10-CM

## 2023-04-21 NOTE — PROGRESS NOTES
Call to patient with  services [facility] ID #[id number] to notify patient of next appointment with Dr. Marcelino Ramírez and her recommendations after his labwork done this week. Recommended oral iron pills to patient to be taken MWF and with Vit C/OJ to help maximize absorption. Pt VU and appreciated the call.

## 2023-04-23 PROBLEM — D75.1 POLYCYTHEMIA: Status: ACTIVE | Noted: 2023-04-23

## 2023-04-23 PROBLEM — Z15.89 JAK2 V617F MUTATION: Status: ACTIVE | Noted: 2023-04-23

## 2023-04-27 ENCOUNTER — OFFICE VISIT (OUTPATIENT)
Dept: FAMILY MEDICINE CLINIC | Facility: CLINIC | Age: 53
End: 2023-04-27

## 2023-04-27 VITALS
OXYGEN SATURATION: 96 % | RESPIRATION RATE: 18 BRPM | SYSTOLIC BLOOD PRESSURE: 118 MMHG | WEIGHT: 198 LBS | BODY MASS INDEX: 31.08 KG/M2 | DIASTOLIC BLOOD PRESSURE: 76 MMHG | HEIGHT: 67 IN | TEMPERATURE: 97.3 F | HEART RATE: 70 BPM

## 2023-04-27 DIAGNOSIS — R76.8 POSITIVE ANA (ANTINUCLEAR ANTIBODY): ICD-10-CM

## 2023-04-27 DIAGNOSIS — R73.03 PREDIABETES: Primary | ICD-10-CM

## 2023-04-27 PROCEDURE — 99214 OFFICE O/P EST MOD 30 MIN: CPT | Performed by: NURSE PRACTITIONER

## 2023-04-27 SDOH — ECONOMIC STABILITY: HOUSING INSECURITY
IN THE LAST 12 MONTHS, WAS THERE A TIME WHEN YOU DID NOT HAVE A STEADY PLACE TO SLEEP OR SLEPT IN A SHELTER (INCLUDING NOW)?: NO

## 2023-04-27 SDOH — ECONOMIC STABILITY: FOOD INSECURITY: WITHIN THE PAST 12 MONTHS, THE FOOD YOU BOUGHT JUST DIDN'T LAST AND YOU DIDN'T HAVE MONEY TO GET MORE.: NEVER TRUE

## 2023-04-27 SDOH — ECONOMIC STABILITY: INCOME INSECURITY: HOW HARD IS IT FOR YOU TO PAY FOR THE VERY BASICS LIKE FOOD, HOUSING, MEDICAL CARE, AND HEATING?: NOT HARD AT ALL

## 2023-04-27 SDOH — ECONOMIC STABILITY: FOOD INSECURITY: WITHIN THE PAST 12 MONTHS, YOU WORRIED THAT YOUR FOOD WOULD RUN OUT BEFORE YOU GOT MONEY TO BUY MORE.: NEVER TRUE

## 2023-04-27 ASSESSMENT — PATIENT HEALTH QUESTIONNAIRE - PHQ9
SUM OF ALL RESPONSES TO PHQ9 QUESTIONS 1 & 2: 0
SUM OF ALL RESPONSES TO PHQ QUESTIONS 1-9: 0
SUM OF ALL RESPONSES TO PHQ QUESTIONS 1-9: 0
1. LITTLE INTEREST OR PLEASURE IN DOING THINGS: 0
SUM OF ALL RESPONSES TO PHQ QUESTIONS 1-9: 0
SUM OF ALL RESPONSES TO PHQ QUESTIONS 1-9: 0
2. FEELING DOWN, DEPRESSED OR HOPELESS: 0

## 2023-04-27 ASSESSMENT — ENCOUNTER SYMPTOMS
WHEEZING: 0
RHINORRHEA: 0
CHEST TIGHTNESS: 0

## 2023-04-27 NOTE — PROGRESS NOTES
Heath Mathias (:  1970) is a 52 y.o. male,Established patient, here for evaluation of the following chief complaint(s):  Depression (Follow up and labs )         ASSESSMENT/PLAN:  1. Prediabetes  -     Hemoglobin A1C; Future    Recheck labs. Follow up in 6 months. Continue with his healthy life style changes. 2. Positive MANJINDER (antinuclear antibody)  -     Rheumatoid Factor; Future  -     Noman Mcnamara MD, Rheumatology, [facility]  Will refer to rheumatologist.  Had two positive MANJINDER tests thru hematologist.    Will rule out rheumatoid arthritis. Anticipatory guidance for age-seatbelts, avoid cell phone use in car (may use hands free), no texting while driving, avoid social drugs and tobacco, limit alcohol, fall safety, personal safety. Encourage healthy diet and exercise daily. Translation services done over office ipad. Patient's questions were answered. Return in about 6 months (around 10/27/2023) for medication refills. Subjective   SUBJECTIVE/OBJECTIVE:   used today for office visit. Has been to see oncologist where he was diagnosed with lupus. Has received no referral to rheumatologist.  Would like further testing. Has not been checked for rheumatoid arthritis. MANJINDER was positive on his labs done by the oncologist. Has appointment with her in July. Denies any joint pain today. Has had no rashes. Feels well other wise. Is not donating blood presently as his numbers were low. Works construction. 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 Would also like to have blood work for diabetes. No Known Allergies  No current outpatient medications on file. No current facility-administered medications for this visit. Review of Systems   Constitutional:  Negative for fatigue, fever and unexpected weight change. .      HENT:

## 2023-04-28 LAB
EST. AVERAGE GLUCOSE BLD GHB EST-MCNC: 108 MG/DL
HBA1C MFR BLD: 5.4 % (ref 4.8–5.6)

## 2023-04-30 ASSESSMENT — ENCOUNTER SYMPTOMS
ABDOMINAL PAIN: 0
DIARRHEA: 0
CONSTIPATION: 0
VOMITING: 0
COUGH: 0
EYE DISCHARGE: 0
EYE PAIN: 0
BLOOD IN STOOL: 0
SHORTNESS OF BREATH: 0

## 2023-05-01 DIAGNOSIS — D75.1 POLYCYTHEMIA: Primary | ICD-10-CM

## 2023-05-02 LAB — RHEUMATOID FACT SER QL LA: NEGATIVE

## 2023-07-17 ENCOUNTER — CLINICAL DOCUMENTATION (OUTPATIENT)
Dept: ONCOLOGY | Age: 53
End: 2023-07-17

## 2023-09-06 ENCOUNTER — OFFICE VISIT (OUTPATIENT)
Dept: FAMILY MEDICINE CLINIC | Facility: CLINIC | Age: 53
End: 2023-09-06

## 2023-09-06 VITALS
DIASTOLIC BLOOD PRESSURE: 78 MMHG | RESPIRATION RATE: 18 BRPM | TEMPERATURE: 97.7 F | OXYGEN SATURATION: 95 % | SYSTOLIC BLOOD PRESSURE: 120 MMHG | HEIGHT: 67 IN | WEIGHT: 197 LBS | HEART RATE: 81 BPM | BODY MASS INDEX: 30.92 KG/M2

## 2023-09-06 DIAGNOSIS — R55 PRE-SYNCOPE: Primary | ICD-10-CM

## 2023-09-06 DIAGNOSIS — D50.9 IRON DEFICIENCY ANEMIA, UNSPECIFIED IRON DEFICIENCY ANEMIA TYPE: ICD-10-CM

## 2023-09-06 DIAGNOSIS — M54.50 ACUTE BILATERAL LOW BACK PAIN WITHOUT SCIATICA: ICD-10-CM

## 2023-09-06 DIAGNOSIS — D75.1 POLYCYTHEMIA: ICD-10-CM

## 2023-09-06 DIAGNOSIS — R20.2 TINGLING OF BOTH FEET: ICD-10-CM

## 2023-09-06 LAB
ANION GAP SERPL CALC-SCNC: 5 MMOL/L (ref 2–11)
BASOPHILS # BLD: 0.1 K/UL (ref 0–0.2)
BASOPHILS NFR BLD: 1 % (ref 0–2)
BUN SERPL-MCNC: 19 MG/DL (ref 6–23)
CALCIUM SERPL-MCNC: 9.2 MG/DL (ref 8.3–10.4)
CHLORIDE SERPL-SCNC: 105 MMOL/L (ref 101–110)
CO2 SERPL-SCNC: 28 MMOL/L (ref 21–32)
CREAT SERPL-MCNC: 1.1 MG/DL (ref 0.8–1.5)
DIFFERENTIAL METHOD BLD: ABNORMAL
EOSINOPHIL # BLD: 0.1 K/UL (ref 0–0.8)
EOSINOPHIL NFR BLD: 1 % (ref 0.5–7.8)
ERYTHROCYTE [DISTWIDTH] IN BLOOD BY AUTOMATED COUNT: 21.9 % (ref 11.9–14.6)
GLUCOSE SERPL-MCNC: 107 MG/DL (ref 65–100)
HCT VFR BLD AUTO: 45.5 % (ref 41.1–50.3)
HGB BLD-MCNC: 13.4 G/DL (ref 13.6–17.2)
IMM GRANULOCYTES # BLD AUTO: 0.1 K/UL (ref 0–0.5)
IMM GRANULOCYTES NFR BLD AUTO: 1 % (ref 0–5)
LYMPHOCYTES # BLD: 1.7 K/UL (ref 0.5–4.6)
LYMPHOCYTES NFR BLD: 18 % (ref 13–44)
MCH RBC QN AUTO: 20.4 PG (ref 26.1–32.9)
MCHC RBC AUTO-ENTMCNC: 29.5 G/DL (ref 31.4–35)
MCV RBC AUTO: 69.4 FL (ref 82–102)
MONOCYTES # BLD: 0.6 K/UL (ref 0.1–1.3)
MONOCYTES NFR BLD: 6 % (ref 4–12)
NEUTS SEG # BLD: 6.6 K/UL (ref 1.7–8.2)
NEUTS SEG NFR BLD: 72 % (ref 43–78)
NRBC # BLD: 0 K/UL (ref 0–0.2)
PLATELET # BLD AUTO: 551 K/UL (ref 150–450)
PMV BLD AUTO: 9.6 FL (ref 9.4–12.3)
POTASSIUM SERPL-SCNC: 3.9 MMOL/L (ref 3.5–5.1)
RBC # BLD AUTO: 6.56 M/UL (ref 4.23–5.6)
SODIUM SERPL-SCNC: 138 MMOL/L (ref 133–143)
WBC # BLD AUTO: 9.1 K/UL (ref 4.3–11.1)

## 2023-09-06 PROCEDURE — 99214 OFFICE O/P EST MOD 30 MIN: CPT | Performed by: FAMILY MEDICINE

## 2023-09-06 SDOH — ECONOMIC STABILITY: INCOME INSECURITY: HOW HARD IS IT FOR YOU TO PAY FOR THE VERY BASICS LIKE FOOD, HOUSING, MEDICAL CARE, AND HEATING?: NOT HARD AT ALL

## 2023-09-06 SDOH — ECONOMIC STABILITY: FOOD INSECURITY: WITHIN THE PAST 12 MONTHS, THE FOOD YOU BOUGHT JUST DIDN'T LAST AND YOU DIDN'T HAVE MONEY TO GET MORE.: NEVER TRUE

## 2023-09-06 SDOH — ECONOMIC STABILITY: FOOD INSECURITY: WITHIN THE PAST 12 MONTHS, YOU WORRIED THAT YOUR FOOD WOULD RUN OUT BEFORE YOU GOT MONEY TO BUY MORE.: NEVER TRUE

## 2023-09-06 ASSESSMENT — PATIENT HEALTH QUESTIONNAIRE - PHQ9
2. FEELING DOWN, DEPRESSED OR HOPELESS: 0
SUM OF ALL RESPONSES TO PHQ QUESTIONS 1-9: 0
SUM OF ALL RESPONSES TO PHQ9 QUESTIONS 1 & 2: 0
SUM OF ALL RESPONSES TO PHQ QUESTIONS 1-9: 0
1. LITTLE INTEREST OR PLEASURE IN DOING THINGS: 0
SUM OF ALL RESPONSES TO PHQ QUESTIONS 1-9: 0
SUM OF ALL RESPONSES TO PHQ QUESTIONS 1-9: 0

## 2023-09-09 NOTE — PROGRESS NOTES
Jermaine Zarate (:  1970) is a 52 y.o. male,Established patient, here for evaluation of the following chief complaint(s): Other (Pt. States he works outside in the [employer] as a , he started to feel weird 2 days ago while working, felt like his bp was elevated, drank a coke and began to feel better, pt. Describes as \"felt weird\" Pt. States he feet feel hot)         ASSESSMENT/PLAN:  1. Pre-syncope  Based on patient's presenting symptoms, could potentially have been due to a hypoglycemic episode given that it resolved right after he had a sugary drink. I did consider other potential causes including cardiac arrhythmias, obstructive cardiomyopathy, pulmonary embolism, vasovagal, however given patient's clinical history surrounding the event these were less likely. -     CBC with Auto Differential; Future  -     Basic Metabolic Panel; Future  2. Tingling of both feet  Unclear etiology, neuro exam was completely intact including cranial nerves, motor testing and sensation. Review of EMR shows that patient does not have any significant history of diabetes that could be a contributory cause. Patient's B12 level noted to be on the high end of normal.  -     Basic Metabolic Panel; Future  3. Acute bilateral low back pain without sciatica  Physical exam was largely unremarkable. Patient stated that the symptoms completely resolved on their own without any medications or intervention.    - Symptoms likely due to the nature of his occupation, patient states that he carries pretty heavy stuff all the time.  - Recommended using a back brace while working.  - Also recommended a combination of Tylenol- 500 and ibuprofen-200 every 8hours  for any acute episodes. Hot and cold packs for symptomatic relief  4. Polycythemia  5.  Iron deficiency anemia, unspecified iron deficiency anemia type  Patient stated that he continues to donate blood every 2 months or so, however was turned away the last time as his

## 2023-09-15 ENCOUNTER — OFFICE VISIT (OUTPATIENT)
Dept: FAMILY MEDICINE CLINIC | Facility: CLINIC | Age: 53
End: 2023-09-15

## 2023-09-15 VITALS
BODY MASS INDEX: 30.76 KG/M2 | SYSTOLIC BLOOD PRESSURE: 110 MMHG | OXYGEN SATURATION: 97 % | HEART RATE: 70 BPM | WEIGHT: 196 LBS | TEMPERATURE: 97.7 F | DIASTOLIC BLOOD PRESSURE: 78 MMHG | HEIGHT: 67 IN | RESPIRATION RATE: 18 BRPM

## 2023-09-15 DIAGNOSIS — M54.50 ACUTE BILATERAL LOW BACK PAIN WITHOUT SCIATICA: Primary | ICD-10-CM

## 2023-09-15 DIAGNOSIS — Z87.898 HX OF DIZZINESS: ICD-10-CM

## 2023-09-15 DIAGNOSIS — D75.1 POLYCYTHEMIA: ICD-10-CM

## 2023-09-15 DIAGNOSIS — D50.8 OTHER IRON DEFICIENCY ANEMIA: ICD-10-CM

## 2023-09-15 PROCEDURE — 99213 OFFICE O/P EST LOW 20 MIN: CPT | Performed by: FAMILY MEDICINE

## 2023-09-15 SDOH — ECONOMIC STABILITY: INCOME INSECURITY: HOW HARD IS IT FOR YOU TO PAY FOR THE VERY BASICS LIKE FOOD, HOUSING, MEDICAL CARE, AND HEATING?: NOT HARD AT ALL

## 2023-09-15 SDOH — ECONOMIC STABILITY: FOOD INSECURITY: WITHIN THE PAST 12 MONTHS, YOU WORRIED THAT YOUR FOOD WOULD RUN OUT BEFORE YOU GOT MONEY TO BUY MORE.: NEVER TRUE

## 2023-09-15 SDOH — ECONOMIC STABILITY: FOOD INSECURITY: WITHIN THE PAST 12 MONTHS, THE FOOD YOU BOUGHT JUST DIDN'T LAST AND YOU DIDN'T HAVE MONEY TO GET MORE.: NEVER TRUE

## 2023-09-15 ASSESSMENT — PATIENT HEALTH QUESTIONNAIRE - PHQ9
1. LITTLE INTEREST OR PLEASURE IN DOING THINGS: 0
SUM OF ALL RESPONSES TO PHQ QUESTIONS 1-9: 0
2. FEELING DOWN, DEPRESSED OR HOPELESS: 0
SUM OF ALL RESPONSES TO PHQ QUESTIONS 1-9: 0
SUM OF ALL RESPONSES TO PHQ9 QUESTIONS 1 & 2: 0
SUM OF ALL RESPONSES TO PHQ QUESTIONS 1-9: 0
SUM OF ALL RESPONSES TO PHQ QUESTIONS 1-9: 0

## 2023-09-15 NOTE — PROGRESS NOTES
Justine Ann (:  1970) is a 52 y.o. male,Established patient, here for evaluation of the following chief complaint(s):  Back Pain (Follow up )         ASSESSMENT/PLAN:  1. Acute bilateral low back pain without sciatica  Normal physical exam today. Symptoms appear to have largely resolved. -Strongly recommended using a back brace given the nature of his occupation  -Recommended continued use of a combination of Tylenol 500 and ibuprofen 200 every 6-8 hours as needed for any acute episodes. Recommended hot and cold packs for symptomatic relief. Also recommended daily range of motion exercises    2. Hx of dizziness  Symptoms appear to have resolved since last visit, reports no further episodes. Review of laboratory test results including CBC and BMP unremarkable for any organic cause. 3. Other iron deficiency anemia; Polycythemia  Patient states that he used to donate blood every 2 months or so, has not done it per recommendation of his physician for the past 2 or 3 months.    -Strongly recommended continuing his follow-up with oncology; modulation of medical management per oncology recommendation  No follow-ups on file. Subjective   SUBJECTIVE/OBJECTIVE:  [facility]   Services was used for this entire office visit. Patient is a pleasant 57-year-old  male who presents for a follow of his complaints of tingling of both feet, low back pain and a brief sensation of loss of strength. Patient denies any further episodes of sensation of loss of strength or ongoing low back pain. He states that it appears that his symptoms over the past couple of weeks have largely resolved. He denies any other acute complaints or concerns today. Patient denies any loss of consciousness, headaches, changes in vision, chest pain or palpitations or falls. He states that he used to donate blood per recommendation of his doctors in the past, however has stopped it for the past 2 to 3 months.     Review

## 2023-09-21 NOTE — PROGRESS NOTES
09/06/2023    MCV 69.4 (L) 09/06/2023     (H) 09/06/2023     Lab Results   Component Value Date     09/06/2023    K 3.9 09/06/2023     09/06/2023    CO2 28 09/06/2023    BUN 19 09/06/2023    CREATININE 1.10 09/06/2023    GLUCOSE 107 (H) 09/06/2023    CALCIUM 9.2 09/06/2023    PROT 7.6 04/20/2023    LABALBU 4.0 04/20/2023    BILITOT 0.4 04/20/2023    ALKPHOS 93 04/20/2023    AST 26 04/20/2023    ALT 42 04/20/2023    LABGLOM >60 09/06/2023    GFRAA 116 03/19/2021    AGRATIO 2.3 (H) 04/27/2022    GLOB 3.6 04/20/2023     Hemoglobin A1C   Date Value Ref Range Status   04/27/2023 5.4 4.8 - 5.6 % Final     Lab Results   Component Value Date    TSH 1.020 04/27/2022       The results above were reviewed and discussed with patient. Assessment and plan:   Gia Burgos is a 52 y.o. male who presents with:     1. Positive MANJINDER (antinuclear antibody)  Overview:  Positive direct MANJINDER x2  Mother has SLE  Assessment & Plan:   Lab was checked to work-up anemia which has been found to be secondary to polycythemia vera and iron deficiency anemia. The patient does not have signs or symptoms of SLE, scleroderma, or Sjogren's disease. We will perform further serologic laboratory testing to further evaluate for low suspicion for any active autoimmune disease at this point. Orders:  -     Anti-Nuclear Ab by IFA; Future  -     Cryoglobulin, Qualitative, with Reflex; Future  -     ALBERTINA and PE, Serum; Future  -     IgG, IgA, IgM; Future  -     C3 Complement; Future  -     C4 Complement; Future  -     Urinalysis; Future  -     Sedimentation Rate; Future  -     C-Reactive Protein; Future  -     Protein / creatinine ratio, urine; Future  2. SS-A antibody positive  Overview:  High titer SSA antibody greater than 8. Found incidentally for work-up of anemia.   No sicca symptoms or clinical evidence of disease  Assessment & Plan:   Even though this antibody is high titer the patient has no clinical evidence or history to

## 2023-09-25 ENCOUNTER — OFFICE VISIT (OUTPATIENT)
Dept: RHEUMATOLOGY | Age: 53
End: 2023-09-25

## 2023-09-25 VITALS
HEIGHT: 67 IN | DIASTOLIC BLOOD PRESSURE: 95 MMHG | SYSTOLIC BLOOD PRESSURE: 135 MMHG | BODY MASS INDEX: 30.45 KG/M2 | HEART RATE: 66 BPM | WEIGHT: 194 LBS

## 2023-09-25 DIAGNOSIS — R76.8 SS-A ANTIBODY POSITIVE: ICD-10-CM

## 2023-09-25 DIAGNOSIS — D75.839 THROMBOCYTOSIS: ICD-10-CM

## 2023-09-25 DIAGNOSIS — R76.8 POSITIVE ANA (ANTINUCLEAR ANTIBODY): Primary | ICD-10-CM

## 2023-09-25 DIAGNOSIS — R76.8 POSITIVE ANA (ANTINUCLEAR ANTIBODY): ICD-10-CM

## 2023-09-25 DIAGNOSIS — D75.1 POLYCYTHEMIA: ICD-10-CM

## 2023-09-25 PROBLEM — D45 POLYCYTHEMIA VERA (HCC): Status: RESOLVED | Noted: 2023-09-25 | Resolved: 2023-09-25

## 2023-09-25 PROBLEM — D45 POLYCYTHEMIA VERA (HCC): Status: ACTIVE | Noted: 2023-09-25

## 2023-09-25 LAB
APPEARANCE UR: NORMAL
BILIRUB UR QL: NEGATIVE
COLOR UR: NORMAL
CREAT UR-MCNC: 257 MG/DL
CRP SERPL-MCNC: <0.3 MG/DL (ref 0–0.9)
ERYTHROCYTE [SEDIMENTATION RATE] IN BLOOD: 4 MM/HR
GLUCOSE UR STRIP.AUTO-MCNC: NEGATIVE MG/DL
HGB UR QL STRIP: NEGATIVE
KETONES UR QL STRIP.AUTO: NEGATIVE MG/DL
LEUKOCYTE ESTERASE UR QL STRIP.AUTO: NEGATIVE
NITRITE UR QL STRIP.AUTO: NEGATIVE
PH UR STRIP: 5.5 (ref 5–9)
PROT UR STRIP-MCNC: NEGATIVE MG/DL
PROT UR-MCNC: 20 MG/DL
PROT/CREAT UR-RTO: 0.1
SP GR UR REFRACTOMETRY: 1.02 (ref 1–1.02)
UROBILINOGEN UR QL STRIP.AUTO: 0.2 EU/DL (ref 0.2–1)

## 2023-09-25 PROCEDURE — 99204 OFFICE O/P NEW MOD 45 MIN: CPT | Performed by: INTERNAL MEDICINE

## 2023-09-25 ASSESSMENT — ROUTINE ASSESSMENT OF PATIENT INDEX DATA (RAPID3)
ON A SCALE OF ONE TO TEN, CONSIDERING ALL THE WAYS IN WHICH ILLNESS AND HEALTH CONDITIONS MAY AFFECT YOU AT THIS TIME, PLEASE INDICATE BELOW HOW YOU ARE DOING:: 0
ON A SCALE OF ONE TO TEN, HOW MUCH PAIN HAVE YOU HAD BECAUSE OF YOUR CONDITION OVER THE PAST WEEK?: 0
WHEN YOU AWAKENED IN THE MORNING OVER THE LAST WEEK, PLEASE INDICATE THE AMOUNT OF TIME IT TAKES UNTIL YOU ARE AS LIMBER AS YOU WILL BE FOR THE DAY: < 10 MIN
ON A SCALE OF ONE TO TEN, HOW MUCH OF A PROBLEM HAS UNUSUAL FATIGUE OR TIREDNESS BEEN FOR YOU OVER THE PAST WEEK?: 0
ON A SCALE OF ONE TO TEN, HOW DIFFICULT WAS IT FOR YOU TO COMPLETE THE LISTED DAILY PHYSICAL TASKS OVER THE LAST WEEK: 0

## 2023-09-25 NOTE — ASSESSMENT & PLAN NOTE
Even though this antibody is high titer the patient has no clinical evidence or history to suggest sicca consistent with primary or secondary Sjogren's disease. We will perform additional lab work to further evaluate for evidence of Sjogren's disease. If negative patient has been advised to return if he were to develop any symptoms consistent with connective tissue disease.

## 2023-09-25 NOTE — ASSESSMENT & PLAN NOTE
Recently diagnosed. Following with hematology, Dr. Hill.   - plan to check BCR/ABL, flow, EPO and LDH at next apt   - possibility of future BMBx and asp reviewed

## 2023-09-25 NOTE — ASSESSMENT & PLAN NOTE
Lab was checked to work-up anemia which has been found to be secondary to polycythemia vera and iron deficiency anemia. The patient does not have signs or symptoms of SLE, scleroderma, or Sjogren's disease. We will perform further serologic laboratory testing to further evaluate for low suspicion for any active autoimmune disease at this point.

## 2023-09-25 NOTE — PATIENT INSTRUCTIONS
Please get your blood work and urine tests done. We will follow-up in 2 weeks in person and review labs results.

## 2023-09-26 LAB
C3 SERPL-MCNC: 128 MG/DL (ref 82–167)
C4 SERPL-MCNC: 24 MG/DL (ref 12–38)

## 2023-09-27 LAB
IGA SERPL-MCNC: 229 MG/DL (ref 90–386)
IGG SERPL-MCNC: 1161 MG/DL (ref 603–1613)
IGM SERPL-MCNC: 107 MG/DL (ref 20–172)

## 2023-09-29 LAB
ALBUMIN SERPL ELPH-MCNC: 3.9 G/DL (ref 2.9–4.4)
ALBUMIN/GLOB SERPL: 1.3 (ref 0.7–1.7)
ALPHA1 GLOB SERPL ELPH-MCNC: 0.2 G/DL (ref 0–0.4)
ALPHA2 GLOB SERPL ELPH-MCNC: 0.6 G/DL (ref 0.4–1)
B-GLOBULIN SERPL ELPH-MCNC: 1.1 G/DL (ref 0.7–1.3)
GAMMA GLOB SERPL ELPH-MCNC: 1.2 G/DL (ref 0.4–1.8)
GLOBULIN SER-MCNC: 3.1 G/DL (ref 2.2–3.9)
IGA SERPL-MCNC: 220 MG/DL (ref 90–386)
IGG SERPL-MCNC: 1149 MG/DL (ref 603–1613)
IGM SERPL-MCNC: 100 MG/DL (ref 20–172)
INTERPRETATION SERPL IEP-IMP: NORMAL
M PROTEIN SERPL ELPH-MCNC: NORMAL G/DL
PROT SERPL-MCNC: 7 G/DL (ref 6–8.5)

## 2023-10-01 LAB
ANA BY IFA: POSITIVE
HOMOGENEOUS PATTERN: ABNORMAL
Lab: ABNORMAL

## 2023-10-07 LAB — CRYOGLOB SER QL 1D COLD INC: NORMAL

## 2023-10-10 ASSESSMENT — ENCOUNTER SYMPTOMS
COUGH: 0
ABDOMINAL PAIN: 0
SCLERAL ICTERUS: 0
CONSTIPATION: 0
DIARRHEA: 0
BACK PAIN: 0
NAUSEA: 0
ABDOMINAL DISTENTION: 0
SORE THROAT: 0
BLOOD IN STOOL: 0
HEMOPTYSIS: 0
SHORTNESS OF BREATH: 0
VOMITING: 0
EYE PROBLEMS: 0

## 2023-10-12 ENCOUNTER — OFFICE VISIT (OUTPATIENT)
Dept: RHEUMATOLOGY | Age: 53
End: 2023-10-12

## 2023-10-12 VITALS
HEIGHT: 67 IN | TEMPERATURE: 97.9 F | SYSTOLIC BLOOD PRESSURE: 128 MMHG | DIASTOLIC BLOOD PRESSURE: 89 MMHG | WEIGHT: 193 LBS | HEART RATE: 64 BPM | BODY MASS INDEX: 30.29 KG/M2

## 2023-10-12 DIAGNOSIS — R76.8 POSITIVE ANA (ANTINUCLEAR ANTIBODY): Primary | ICD-10-CM

## 2023-10-12 DIAGNOSIS — R76.8 SS-A ANTIBODY POSITIVE: ICD-10-CM

## 2023-10-12 PROCEDURE — 99213 OFFICE O/P EST LOW 20 MIN: CPT | Performed by: INTERNAL MEDICINE

## 2023-10-12 ASSESSMENT — ROUTINE ASSESSMENT OF PATIENT INDEX DATA (RAPID3)
ON A SCALE OF ONE TO TEN, CONSIDERING ALL THE WAYS IN WHICH ILLNESS AND HEALTH CONDITIONS MAY AFFECT YOU AT THIS TIME, PLEASE INDICATE BELOW HOW YOU ARE DOING:: 0
ON A SCALE OF ONE TO TEN, HOW DIFFICULT WAS IT FOR YOU TO COMPLETE THE LISTED DAILY PHYSICAL TASKS OVER THE LAST WEEK: 0
ON A SCALE OF ONE TO TEN, HOW MUCH OF A PROBLEM HAS UNUSUAL FATIGUE OR TIREDNESS BEEN FOR YOU OVER THE PAST WEEK?: 0
ON A SCALE OF ONE TO TEN, HOW MUCH PAIN HAVE YOU HAD BECAUSE OF YOUR CONDITION OVER THE PAST WEEK?: 0

## 2023-10-12 NOTE — PATIENT INSTRUCTIONS
No evidence of autoimmune disease right now  Will meet in 1 year to follow-up, and please get labs done 1 week before we meet 10/12/2024

## 2023-10-12 NOTE — PROGRESS NOTES
antibody greater than 8. Found incidentally for work-up of anemia. No sicca symptoms or clinical evidence of disease  Assessment & Plan:   -Low suspicion for active Sjogren's disease, possible that the patient may go on to develop disease given high titer  -No indication for diagnostic eye or ENT testing without symptoms or clinical evidence of sicca  -Patient given handout on Sjogren's disease so that he is aware of symptoms and advised to call or reach out if he has any symptoms,  -No indication for treatment at this time  -We will follow-up in 1 year for repeat disease activity labs, physical exam, history  Orders:  -     Cryoglobulin, Qualitative, with Reflex; Future  -     ALBERTINA and PE, Serum; Future  -     IgG 4; Future  -     IgG, IgA, IgM; Future  -     Rheumatoid Factor, Qt; Future  -     CBC with Auto Differential; Future  -     Comprehensive Metabolic Panel; Future  -     Sedimentation Rate; Future  -     C-Reactive Protein; Future  -     C3 Complement; Future  -     C4 Complement; Future  -     Urinalysis; Future  -     Protein / creatinine ratio, urine; Future        Return in about 1 year (around 10/12/2024) for follow up. Patient Instructions   No evidence of autoimmune disease right now  Will meet in 1 year to follow-up, and please get labs done 1 week before we meet 10/12/2024        Disease activity plan:  As stated above. Steroid management plan:  As stated above, if applicable. Pain management plan:  As stated above, if applicable. Weight management plan:  Weight loss through diet and exercise is always encouraged    Disease prognosis: Good        I appreciate the opportunity to continue to participate in the care of this patient. Follow-up and Dispositions    Return in about 1 year (around 10/12/2024) for follow up. Electronically signed by: Stephanie Tomlin DO      This note was dictated using dragon voice recognition software.   It has been proofread, but there may still

## 2023-10-12 NOTE — ASSESSMENT & PLAN NOTE
-Low suspicion for active Sjogren's disease, possible that the patient may go on to develop disease given high titer  -No indication for diagnostic eye or ENT testing without symptoms or clinical evidence of sicca  -Patient given handout on Sjogren's disease so that he is aware of symptoms and advised to call or reach out if he has any symptoms,  -No indication for treatment at this time  -We will follow-up in 1 year for repeat disease activity labs, physical exam, history

## 2023-10-18 ENCOUNTER — HOSPITAL ENCOUNTER (OUTPATIENT)
Dept: LAB | Age: 53
Discharge: HOME | End: 2023-10-21

## 2023-10-18 ENCOUNTER — OFFICE VISIT (OUTPATIENT)
Dept: ONCOLOGY | Age: 53
End: 2023-10-18

## 2023-10-18 VITALS
TEMPERATURE: 97.4 F | RESPIRATION RATE: 17 BRPM | BODY MASS INDEX: 30.07 KG/M2 | OXYGEN SATURATION: 97 % | HEART RATE: 66 BPM | WEIGHT: 192 LBS | SYSTOLIC BLOOD PRESSURE: 133 MMHG | DIASTOLIC BLOOD PRESSURE: 95 MMHG

## 2023-10-18 DIAGNOSIS — R71.8 ELEVATED RED BLOOD CELL COUNT: ICD-10-CM

## 2023-10-18 DIAGNOSIS — Z15.89 JAK2 V617F MUTATION: ICD-10-CM

## 2023-10-18 DIAGNOSIS — D75.1 POLYCYTHEMIA: ICD-10-CM

## 2023-10-18 DIAGNOSIS — D47.1 MYELOPROLIFERATIVE DISORDER (HCC): Primary | ICD-10-CM

## 2023-10-18 LAB
ALBUMIN SERPL-MCNC: 4 G/DL (ref 3.5–5)
ALBUMIN/GLOB SERPL: 1.2 (ref 0.4–1.6)
ALP SERPL-CCNC: 83 U/L (ref 50–136)
ALT SERPL-CCNC: 40 U/L (ref 12–65)
ANION GAP SERPL CALC-SCNC: 6 MMOL/L (ref 2–11)
AST SERPL-CCNC: 24 U/L (ref 15–37)
BASOPHILS # BLD: 0.1 K/UL (ref 0–0.2)
BASOPHILS NFR BLD: 1 % (ref 0–2)
BILIRUB SERPL-MCNC: 0.8 MG/DL (ref 0.2–1.1)
BUN SERPL-MCNC: 11 MG/DL (ref 6–23)
CALCIUM SERPL-MCNC: 8.8 MG/DL (ref 8.3–10.4)
CHLORIDE SERPL-SCNC: 105 MMOL/L (ref 101–110)
CO2 SERPL-SCNC: 26 MMOL/L (ref 21–32)
CREAT SERPL-MCNC: 0.8 MG/DL (ref 0.8–1.5)
DIFFERENTIAL METHOD BLD: ABNORMAL
EOSINOPHIL # BLD: 0.2 K/UL (ref 0–0.8)
EOSINOPHIL NFR BLD: 2 % (ref 0.5–7.8)
ERYTHROCYTE [DISTWIDTH] IN BLOOD BY AUTOMATED COUNT: 22.9 % (ref 11.9–14.6)
FERRITIN SERPL-MCNC: 9 NG/ML (ref 8–388)
GLOBULIN SER CALC-MCNC: 3.3 G/DL (ref 2.8–4.5)
GLUCOSE SERPL-MCNC: 95 MG/DL (ref 65–100)
HCT VFR BLD AUTO: 49.5 % (ref 41.1–50.3)
HGB BLD-MCNC: 15.3 G/DL (ref 13.6–17.2)
IMM GRANULOCYTES # BLD AUTO: 0.1 K/UL (ref 0–0.5)
IMM GRANULOCYTES NFR BLD AUTO: 1 % (ref 0–5)
IRON SATN MFR SERPL: 10 %
IRON SERPL-MCNC: 47 UG/DL (ref 35–150)
LDH SERPL L TO P-CCNC: 207 U/L (ref 100–190)
LYMPHOCYTES # BLD: 1.6 K/UL (ref 0.5–4.6)
LYMPHOCYTES NFR BLD: 21 % (ref 13–44)
MCH RBC QN AUTO: 22.1 PG (ref 26.1–32.9)
MCHC RBC AUTO-ENTMCNC: 30.9 G/DL (ref 31.4–35)
MCV RBC AUTO: 71.5 FL (ref 82–102)
MONOCYTES # BLD: 0.4 K/UL (ref 0.1–1.3)
MONOCYTES NFR BLD: 5 % (ref 4–12)
NEUTS SEG # BLD: 5.3 K/UL (ref 1.7–8.2)
NEUTS SEG NFR BLD: 70 % (ref 43–78)
NRBC # BLD: 0.02 K/UL (ref 0–0.2)
PLATELET # BLD AUTO: 606 K/UL (ref 150–450)
PMV BLD AUTO: 9 FL (ref 9.4–12.3)
POTASSIUM SERPL-SCNC: 3.8 MMOL/L (ref 3.5–5.1)
PROT SERPL-MCNC: 7.3 G/DL (ref 6.3–8.2)
RBC # BLD AUTO: 6.92 M/UL (ref 4.23–5.6)
SODIUM SERPL-SCNC: 137 MMOL/L (ref 133–143)
TIBC SERPL-MCNC: 458 UG/DL (ref 250–450)
WBC # BLD AUTO: 7.6 K/UL (ref 4.3–11.1)

## 2023-10-18 PROCEDURE — 99214 OFFICE O/P EST MOD 30 MIN: CPT | Performed by: INTERNAL MEDICINE

## 2023-10-18 PROCEDURE — 82668 ASSAY OF ERYTHROPOIETIN: CPT

## 2023-10-18 PROCEDURE — 80053 COMPREHEN METABOLIC PANEL: CPT

## 2023-10-18 PROCEDURE — 83550 IRON BINDING TEST: CPT

## 2023-10-18 PROCEDURE — 81207 BCR/ABL1 GENE MINOR BP: CPT

## 2023-10-18 PROCEDURE — 82728 ASSAY OF FERRITIN: CPT

## 2023-10-18 PROCEDURE — 85025 COMPLETE CBC W/AUTO DIFF WBC: CPT

## 2023-10-18 PROCEDURE — 83615 LACTATE (LD) (LDH) ENZYME: CPT

## 2023-10-18 PROCEDURE — 36415 COLL VENOUS BLD VENIPUNCTURE: CPT

## 2023-10-18 PROCEDURE — 81206 BCR/ABL1 GENE MAJOR BP: CPT

## 2023-10-18 PROCEDURE — 83540 ASSAY OF IRON: CPT

## 2023-10-18 ASSESSMENT — PATIENT HEALTH QUESTIONNAIRE - PHQ9
SUM OF ALL RESPONSES TO PHQ QUESTIONS 1-9: 0
1. LITTLE INTEREST OR PLEASURE IN DOING THINGS: 0
SUM OF ALL RESPONSES TO PHQ QUESTIONS 1-9: 0
SUM OF ALL RESPONSES TO PHQ9 QUESTIONS 1 & 2: 0
2. FEELING DOWN, DEPRESSED OR HOPELESS: 0

## 2023-10-18 NOTE — PATIENT INSTRUCTIONS
n/a        -------------------------------------------------------------------------------------------------------------------  Please call our office at [phone] if you have any  of the following symptoms:   Fever of 100.5 or greater  Chills  Shortness of breath  Swelling or pain in one leg    After office hours an answering service is available and will contact a provider for emergencies or if you are experiencing any of the above symptoms. Patient did express an interest in My Chart. My Chart log in information explained on the after visit summary printout at the [address] desk.     Iqra Pham RN

## 2023-10-19 LAB — EPO SERPL-ACNC: 4.5 MIU/ML (ref 2.6–18.5)

## 2023-10-20 LAB
FLOW CYTOMETRY RESULTS: NORMAL
SPECIMEN SOURCE: NORMAL
TEST ORDERED: NORMAL

## 2023-10-24 LAB
INTERPRETATION: NEGATIVE
LAB DIRECTOR NAME PROVIDER: NORMAL
LABORATORY COMMENT REPORT: NORMAL
T(ABL1,BCR)B2A2/CONTROL BLD/T: NORMAL
T(ABL1,BCR)B2A2/CONTROL BLD/T: NORMAL %
T(ABL1,BCR)B3A2/CONTROL BLD/T: NORMAL %
T(ABL1,BCR)E1A2/CONTROL BLD/T: NORMAL %

## 2023-10-27 ENCOUNTER — TELEPHONE (OUTPATIENT)
Dept: RADIATION ONCOLOGY | Age: 53
End: 2023-10-27

## 2023-11-07 ENCOUNTER — TELEPHONE (OUTPATIENT)
Dept: ONCOLOGY | Age: 53
End: 2023-11-07

## 2023-11-07 NOTE — TELEPHONE ENCOUNTER
Pt son req to r/s bone bx on 11/13, offered pt 11/21, pt specific req for 12/13 bmbx.  Pt r/s and fu moved approx 2-3 weeks after bmbx

## 2023-11-17 ENCOUNTER — APPOINTMENT (OUTPATIENT)
Dept: GENERAL RADIOLOGY | Age: 53
End: 2023-11-17

## 2023-11-17 ENCOUNTER — HOSPITAL ENCOUNTER (EMERGENCY)
Age: 53
Discharge: HOME | End: 2023-11-17
Attending: EMERGENCY MEDICINE

## 2023-11-17 VITALS
SYSTOLIC BLOOD PRESSURE: 137 MMHG | HEART RATE: 77 BPM | WEIGHT: 182 LBS | TEMPERATURE: 98.7 F | OXYGEN SATURATION: 100 % | RESPIRATION RATE: 12 BRPM | HEIGHT: 67 IN | BODY MASS INDEX: 28.56 KG/M2 | DIASTOLIC BLOOD PRESSURE: 75 MMHG

## 2023-11-17 DIAGNOSIS — R05.1 ACUTE COUGH: Primary | ICD-10-CM

## 2023-11-17 DIAGNOSIS — R05.8 POST-TUSSIVE SYNCOPE: ICD-10-CM

## 2023-11-17 LAB
ALBUMIN SERPL-MCNC: 4.3 G/DL (ref 3.5–5)
ALBUMIN/GLOB SERPL: 1.4 (ref 0.4–1.6)
ALP SERPL-CCNC: 102 U/L (ref 45–117)
ALT SERPL-CCNC: 24 U/L (ref 13–61)
ANION GAP SERPL CALC-SCNC: 11 MMOL/L (ref 2–11)
AST SERPL-CCNC: 23 U/L (ref 15–37)
BASOPHILS # BLD: 0.1 K/UL (ref 0–0.2)
BASOPHILS NFR BLD: 1 % (ref 0–2)
BILIRUB SERPL-MCNC: 0.2 MG/DL (ref 0.2–1.1)
BUN SERPL-MCNC: 9 MG/DL (ref 6–23)
CALCIUM SERPL-MCNC: 8.9 MG/DL (ref 8.3–10.4)
CHLORIDE SERPL-SCNC: 102 MMOL/L (ref 98–107)
CO2 SERPL-SCNC: 27 MMOL/L (ref 21–32)
CREAT SERPL-MCNC: 1.07 MG/DL (ref 0.8–1.5)
DIFFERENTIAL METHOD BLD: ABNORMAL
EKG ATRIAL RATE: 80 BPM
EKG DIAGNOSIS: NORMAL
EKG P AXIS: 84 DEGREES
EKG P-R INTERVAL: 166 MS
EKG Q-T INTERVAL: 368 MS
EKG QRS DURATION: 98 MS
EKG QTC CALCULATION (BAZETT): 425 MS
EKG R AXIS: 97 DEGREES
EKG T AXIS: 56 DEGREES
EKG VENTRICULAR RATE: 80 BPM
EOSINOPHIL # BLD: 0.3 K/UL (ref 0–0.8)
EOSINOPHIL NFR BLD: 3 % (ref 0.5–7.8)
ERYTHROCYTE [DISTWIDTH] IN BLOOD BY AUTOMATED COUNT: 21.4 % (ref 11.9–14.6)
FLUAV RNA SPEC QL NAA+PROBE: NOT DETECTED
FLUBV RNA SPEC QL NAA+PROBE: NOT DETECTED
GLOBULIN SER CALC-MCNC: 3 G/DL (ref 2.8–4.5)
GLUCOSE SERPL-MCNC: 114 MG/DL (ref 65–100)
HCT VFR BLD AUTO: 46.6 % (ref 41.1–50.3)
HGB BLD-MCNC: 14.7 G/DL (ref 13.6–17.2)
IMM GRANULOCYTES # BLD AUTO: 0.1 K/UL (ref 0–0.5)
IMM GRANULOCYTES NFR BLD AUTO: 1 % (ref 0–5)
LYMPHOCYTES # BLD: 2.3 K/UL (ref 0.5–4.6)
LYMPHOCYTES NFR BLD: 27 % (ref 13–44)
MCH RBC QN AUTO: 23.6 PG (ref 26.1–32.9)
MCHC RBC AUTO-ENTMCNC: 31.5 G/DL (ref 31.4–35)
MCV RBC AUTO: 74.8 FL (ref 82–102)
MONOCYTES # BLD: 0.6 K/UL (ref 0.1–1.3)
MONOCYTES NFR BLD: 7 % (ref 4–12)
NEUTS SEG # BLD: 5.1 K/UL (ref 1.7–8.2)
NEUTS SEG NFR BLD: 61 % (ref 43–78)
NRBC # BLD: 0 K/UL (ref 0–0.2)
PLATELET # BLD AUTO: 602 K/UL (ref 150–450)
PMV BLD AUTO: 9.1 FL (ref 9.4–12.3)
POTASSIUM SERPL-SCNC: 3.9 MMOL/L (ref 3.5–5.1)
PROT SERPL-MCNC: 7.3 G/DL (ref 6.4–8.2)
RBC # BLD AUTO: 6.23 M/UL (ref 4.23–5.6)
SARS-COV-2 RDRP RESP QL NAA+PROBE: NOT DETECTED
SODIUM SERPL-SCNC: 140 MMOL/L (ref 133–143)
SOURCE: NORMAL
WBC # BLD AUTO: 8.4 K/UL (ref 4.3–11.1)

## 2023-11-17 PROCEDURE — 71045 X-RAY EXAM CHEST 1 VIEW: CPT

## 2023-11-17 PROCEDURE — 99285 EMERGENCY DEPT VISIT HI MDM: CPT

## 2023-11-17 PROCEDURE — 93010 ELECTROCARDIOGRAM REPORT: CPT | Performed by: INTERNAL MEDICINE

## 2023-11-17 PROCEDURE — 80053 COMPREHEN METABOLIC PANEL: CPT

## 2023-11-17 PROCEDURE — 6370000000 HC RX 637 (ALT 250 FOR IP): Performed by: EMERGENCY MEDICINE

## 2023-11-17 PROCEDURE — 87502 INFLUENZA DNA AMP PROBE: CPT

## 2023-11-17 PROCEDURE — 87635 SARS-COV-2 COVID-19 AMP PRB: CPT

## 2023-11-17 PROCEDURE — 85025 COMPLETE CBC W/AUTO DIFF WBC: CPT

## 2023-11-17 PROCEDURE — 93005 ELECTROCARDIOGRAM TRACING: CPT | Performed by: EMERGENCY MEDICINE

## 2023-11-17 PROCEDURE — 6360000002 HC RX W HCPCS: Performed by: EMERGENCY MEDICINE

## 2023-11-17 PROCEDURE — 96374 THER/PROPH/DIAG INJ IV PUSH: CPT

## 2023-11-17 PROCEDURE — 94762 N-INVAS EAR/PLS OXIMTRY CONT: CPT

## 2023-11-17 RX ORDER — DEXAMETHASONE SODIUM PHOSPHATE 10 MG/ML
8 INJECTION INTRAMUSCULAR; INTRAVENOUS
Status: COMPLETED | OUTPATIENT
Start: 2023-11-17 | End: 2023-11-17

## 2023-11-17 RX ORDER — IPRATROPIUM BROMIDE AND ALBUTEROL SULFATE 2.5; .5 MG/3ML; MG/3ML
1 SOLUTION RESPIRATORY (INHALATION)
Status: COMPLETED | OUTPATIENT
Start: 2023-11-17 | End: 2023-11-17

## 2023-11-17 RX ORDER — DOXYCYCLINE HYCLATE 100 MG
100 TABLET ORAL 2 TIMES DAILY
Qty: 14 TABLET | Refills: 0 | Status: SHIPPED | OUTPATIENT
Start: 2023-11-17 | End: 2023-11-24

## 2023-11-17 RX ORDER — DEXAMETHASONE 2 MG/1
TABLET ORAL
Qty: 42 TABLET | Refills: 0 | Status: SHIPPED | OUTPATIENT
Start: 2023-11-17 | End: 2023-11-28

## 2023-11-17 RX ORDER — ALBUTEROL SULFATE 90 UG/1
2 AEROSOL, METERED RESPIRATORY (INHALATION) 4 TIMES DAILY PRN
Qty: 18 G | Refills: 0 | Status: SHIPPED | OUTPATIENT
Start: 2023-11-17

## 2023-11-17 RX ADMIN — DEXAMETHASONE SODIUM PHOSPHATE 8 MG: 10 INJECTION INTRAMUSCULAR; INTRAVENOUS at 20:55

## 2023-11-17 RX ADMIN — IPRATROPIUM BROMIDE AND ALBUTEROL SULFATE 1 DOSE: .5; 3 SOLUTION RESPIRATORY (INHALATION) at 20:56

## 2023-11-17 ASSESSMENT — LIFESTYLE VARIABLES: HOW OFTEN DO YOU HAVE A DRINK CONTAINING ALCOHOL: 2-4 TIMES A MONTH

## 2023-11-17 ASSESSMENT — PAIN - FUNCTIONAL ASSESSMENT: PAIN_FUNCTIONAL_ASSESSMENT: NONE - DENIES PAIN

## 2023-11-18 NOTE — ED PROVIDER NOTES
Discharge Medication List as of 11/17/2023  9:20 PM        CONTINUE these medications which have NOT CHANGED    Details   Multiple Vitamin (MULTIVITAMIN ADULT PO) Take by mouthHistorical Med              Results for orders placed or performed during the hospital encounter of 11/17/23   COVID-19, Rapid    Specimen: Nasopharyngeal   Result Value Ref Range    Source Nasopharyngeal      SARS-CoV-2, Rapid Not detected NOTD     Influenza A/B, Molecular    Specimen: Not Specified   Result Value Ref Range    Influenza A, TAMRA Not detected NOTD      Influenza B, TAMRA Not detected NOTD     XR CHEST PORTABLE    Narrative    EXAMINATION: XR CHEST PORTABLE      DATE OF EXAM: 11/17/2023 8:34 PM    HISTORY: Syncope    COMPARISON: None. FINDINGS:    Hardware:  None present. Lung Parenchyma: No evidence of infiltrate. No evidence of edema. Pleura: No evidence of pleural effusion. Cardiomediastinal contour: Cardiac silhouette unremarkable. Mediastinal contour   unremarkable. Bones:Unremarkable. Impression    1. Normal.        Thank you for the referral of this patient. This exam was interpreted by an   American Board of Radiology certified radiologist with subspecialty fellowship   in [address] [place] [address]. If there are questions about this or other reports you can   contact a radiologist directly at [phone]   You can also reach me directly   at [email or web address]       Yen Hagen M.D., [facility]   11/17/2023 8:49:00 PM   CBC with Auto Differential   Result Value Ref Range    WBC 8.4 4.3 - 11.1 K/uL    RBC 6.23 (H) 4.23 - 5.60 M/uL    Hemoglobin 14.7 13.6 - 17.2 g/dL    Hematocrit 46.6 41.1 - 50.3 %    MCV 74.8 (L) 82.0 - 102.0 FL    MCH 23.6 (L) 26.1 - 32.9 PG    MCHC 31.5 31.4 - 35.0 g/dL    RDW 21.4 (H) 11.9 - 14.6 %    Platelets 495 (H) 846 - 450 K/uL    MPV 9.1 (L) 9.4 - 12.3 FL    nRBC 0.00 0.0 - 0.2 K/uL    Differential Type AUTOMATED      Neutrophils % 61 43 - 78 %    Lymphocytes % 27 13 - 44 %    Monocytes % 7 4.0 -
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
## 2023-11-18 NOTE — ED NOTES
Orthostatic Vitals:    Layin/96 BP  89 pulse  Sittin/99 BP  80 pulse  Standin/99 BP  78 pulse     [place], [address]  [address]

## 2023-12-14 ENCOUNTER — HOSPITAL ENCOUNTER (OUTPATIENT)
Dept: LAB | Age: 53
Discharge: HOME | End: 2023-12-14

## 2023-12-14 ENCOUNTER — OFFICE VISIT (OUTPATIENT)
Dept: ONCOLOGY | Age: 53
End: 2023-12-14

## 2023-12-14 ENCOUNTER — CLINICAL DOCUMENTATION (OUTPATIENT)
Facility: HOSPITAL | Age: 53
End: 2023-12-14

## 2023-12-14 VITALS
BODY MASS INDEX: 29.66 KG/M2 | HEIGHT: 67 IN | RESPIRATION RATE: 16 BRPM | OXYGEN SATURATION: 98 % | SYSTOLIC BLOOD PRESSURE: 138 MMHG | DIASTOLIC BLOOD PRESSURE: 89 MMHG | TEMPERATURE: 97.6 F | HEART RATE: 71 BPM | WEIGHT: 189 LBS

## 2023-12-14 DIAGNOSIS — D47.1 MYELOPROLIFERATIVE DISORDER (HCC): ICD-10-CM

## 2023-12-14 DIAGNOSIS — D47.1 MYELOPROLIFERATIVE DISORDER (HCC): Primary | ICD-10-CM

## 2023-12-14 DIAGNOSIS — D64.9 ANEMIA, UNSPECIFIED TYPE: ICD-10-CM

## 2023-12-14 DIAGNOSIS — R71.8 ELEVATED RED BLOOD CELL COUNT: ICD-10-CM

## 2023-12-14 LAB
ALBUMIN SERPL-MCNC: 3.7 G/DL (ref 3.5–5)
ALBUMIN/GLOB SERPL: 1.1 (ref 0.4–1.6)
ALP SERPL-CCNC: 86 U/L (ref 50–136)
ALT SERPL-CCNC: 32 U/L (ref 12–65)
ANION GAP SERPL CALC-SCNC: 6 MMOL/L (ref 2–11)
AST SERPL-CCNC: 16 U/L (ref 15–37)
BASOPHILS # BLD: 0.1 K/UL (ref 0–0.2)
BASOPHILS NFR BLD: 1 % (ref 0–2)
BILIRUB SERPL-MCNC: 0.8 MG/DL (ref 0.2–1.1)
BUN SERPL-MCNC: 11 MG/DL (ref 6–23)
CALCIUM SERPL-MCNC: 9 MG/DL (ref 8.3–10.4)
CHLORIDE SERPL-SCNC: 106 MMOL/L (ref 103–113)
CO2 SERPL-SCNC: 26 MMOL/L (ref 21–32)
CREAT SERPL-MCNC: 0.7 MG/DL (ref 0.8–1.5)
DIFFERENTIAL METHOD BLD: ABNORMAL
EOSINOPHIL # BLD: 0.1 K/UL (ref 0–0.8)
EOSINOPHIL NFR BLD: 1 % (ref 0.5–7.8)
ERYTHROCYTE [DISTWIDTH] IN BLOOD BY AUTOMATED COUNT: 20.8 % (ref 11.9–14.6)
FERRITIN SERPL-MCNC: 9 NG/ML (ref 8–388)
GLOBULIN SER CALC-MCNC: 3.3 G/DL (ref 2.8–4.5)
GLUCOSE SERPL-MCNC: 109 MG/DL (ref 65–100)
HCT VFR BLD AUTO: 50.9 % (ref 41.1–50.3)
HGB BLD-MCNC: 16.1 G/DL (ref 13.6–17.2)
IMM GRANULOCYTES # BLD AUTO: 0.1 K/UL (ref 0–0.5)
IMM GRANULOCYTES NFR BLD AUTO: 1 % (ref 0–5)
IRON SATN MFR SERPL: 11 %
IRON SERPL-MCNC: 53 UG/DL (ref 35–150)
LYMPHOCYTES # BLD: 1.3 K/UL (ref 0.5–4.6)
LYMPHOCYTES NFR BLD: 12 % (ref 13–44)
MCH RBC QN AUTO: 23.9 PG (ref 26.1–32.9)
MCHC RBC AUTO-ENTMCNC: 31.6 G/DL (ref 31.4–35)
MCV RBC AUTO: 75.6 FL (ref 82–102)
MONOCYTES # BLD: 0.5 K/UL (ref 0.1–1.3)
MONOCYTES NFR BLD: 5 % (ref 4–12)
NEUTS SEG # BLD: 8.1 K/UL (ref 1.7–8.2)
NEUTS SEG NFR BLD: 80 % (ref 43–78)
NRBC # BLD: 0 K/UL (ref 0–0.2)
PLATELET # BLD AUTO: 574 K/UL (ref 150–450)
PMV BLD AUTO: 8.8 FL (ref 9.4–12.3)
POTASSIUM SERPL-SCNC: 3.8 MMOL/L (ref 3.5–5.1)
PROT SERPL-MCNC: 7 G/DL (ref 6.3–8.2)
RBC # BLD AUTO: 6.73 M/UL (ref 4.23–5.6)
SODIUM SERPL-SCNC: 138 MMOL/L (ref 136–146)
TIBC SERPL-MCNC: 487 UG/DL (ref 250–450)
WBC # BLD AUTO: 10.1 K/UL (ref 4.3–11.1)

## 2023-12-14 PROCEDURE — 83540 ASSAY OF IRON: CPT

## 2023-12-14 PROCEDURE — 80053 COMPREHEN METABOLIC PANEL: CPT

## 2023-12-14 PROCEDURE — 88305 TISSUE EXAM BY PATHOLOGIST: CPT

## 2023-12-14 PROCEDURE — 88313 SPECIAL STAINS GROUP 2: CPT

## 2023-12-14 PROCEDURE — 88311 DECALCIFY TISSUE: CPT

## 2023-12-14 PROCEDURE — 36415 COLL VENOUS BLD VENIPUNCTURE: CPT

## 2023-12-14 PROCEDURE — 82728 ASSAY OF FERRITIN: CPT

## 2023-12-14 PROCEDURE — 38222 DX BONE MARROW BX & ASPIR: CPT | Performed by: NURSE PRACTITIONER

## 2023-12-14 PROCEDURE — 83550 IRON BINDING TEST: CPT

## 2023-12-14 PROCEDURE — 85025 COMPLETE CBC W/AUTO DIFF WBC: CPT

## 2023-12-14 NOTE — PROGRESS NOTES
Called patient in regards to insurance. Patient spoke little english and requested to have an . I will follow up with an .

## 2023-12-14 NOTE — PROGRESS NOTES
Patient Name: Nancy Abad    Date of Visit: 2023  : 1970  Age:53 y.o. Referring MD:Jose Rivas MD      BONE MARROW ASPIRATE AND BIOPSY    After obtaining consent from the patient, the left posterior superior iliac crest was anesthetized using 3 cc of 1% Xylocaine. After documenting adequate local anesthesia, an aspirate was removed without difficulty. Following this, a biopsy needle was utilized and a core of marrow obtained for analysis. Specimens were sent to pathology for hematologic processing. Hemostasis was adequate at completion. The patient tolerated the procedure well.             [facility]  [address]  Office : [phone]  Fax : [phone]

## 2023-12-29 LAB
FLOW CYTOMETRY RESULTS: NORMAL
SPECIMEN SOURCE: NORMAL
TEST ORDERED: NORMAL

## 2024-01-02 ASSESSMENT — ENCOUNTER SYMPTOMS
BLOOD IN STOOL: 0
BACK PAIN: 0
VOMITING: 0
HEMOPTYSIS: 0
DIARRHEA: 0
EYE PROBLEMS: 0
NAUSEA: 0
SORE THROAT: 0
SHORTNESS OF BREATH: 0
COUGH: 0
ABDOMINAL DISTENTION: 0
CONSTIPATION: 0
ABDOMINAL PAIN: 0
SCLERAL ICTERUS: 0

## 2024-01-02 NOTE — PROGRESS NOTES
[facility] Hematology and Oncology: Established patient - follow up     Chief Complaint   Patient presents with    Follow-up     Diagnosis - polycythemia vera     Reason for Referral: Elevated red blood cell count  Referring Provider:  RIOS Titus CNP  Family History of Cancer/Hematologic Disorders: Family history is significant for patient's brother with polycythemia.   Presenting Symptoms: No relevant physical symptoms reported.     History of Present Illness:  Mr. Reyes is a 53 y.o. male who presents today for follow up regarding polycythemia.  The past medical history is significant for rheumatoid arthritis, hyperkalemia, WILI, depression, elevated blood sugar, elevated cholesterol, and lipoma removal.  He reported a diagnosis of hemochromatosis to PCP for which he donates blood every 2 months.  He was most recently seen by his PCP for routine follow-up on 10/27/22.  Review of systems and physical exam were unremarkable.  Labs from same day: RBC 6.20, HGB 11.9, MCV 68.5, MCH 19.2, MCHC 28.0, RDW 19.0, and .  Patient reported to PCP that his brother also has polycythemia.  He denied any clotting issues, bruising, or abdominal pain.  He continues to donate blood at [facility] every 2 months.  After review of labs, PCP noted, “Referred to hematologist. Would like to just make sure that there is no other treatment needed for his polycythemia.”    At consultation, he was here with his daughter.  Pt was not sure of his diagnosis.  He did not know if he has PV or hemochromatosis.  Daughter is here with him, and she does not know etiology of his original polycythemia.  Pt has been donating blood leading to microcytic anemia.  Will need to complete further workup before final recs are made.  I recommend holding off on phlebotomy at this time, he reported episodes of dizziness and fatigue.  He wishes to p/w further workup at this time.    No HFE.    - here for FU.  Labs c/w MPN.  [employer]

## 2024-01-04 ENCOUNTER — OFFICE VISIT (OUTPATIENT)
Dept: ONCOLOGY | Age: 54
End: 2024-01-04
Payer: COMMERCIAL

## 2024-01-04 VITALS
SYSTOLIC BLOOD PRESSURE: 126 MMHG | WEIGHT: 193.2 LBS | OXYGEN SATURATION: 93 % | TEMPERATURE: 97.9 F | HEART RATE: 96 BPM | BODY MASS INDEX: 30.32 KG/M2 | DIASTOLIC BLOOD PRESSURE: 83 MMHG | HEIGHT: 67 IN | RESPIRATION RATE: 16 BRPM

## 2024-01-04 DIAGNOSIS — Z15.89 JAK2 V617F MUTATION: ICD-10-CM

## 2024-01-04 DIAGNOSIS — D47.1 MYELOPROLIFERATIVE DISORDER (HCC): Primary | ICD-10-CM

## 2024-01-04 PROCEDURE — 99215 OFFICE O/P EST HI 40 MIN: CPT | Performed by: INTERNAL MEDICINE

## 2024-01-04 ASSESSMENT — PATIENT HEALTH QUESTIONNAIRE - PHQ9
SUM OF ALL RESPONSES TO PHQ9 QUESTIONS 1 & 2: 0
SUM OF ALL RESPONSES TO PHQ QUESTIONS 1-9: 0
SUM OF ALL RESPONSES TO PHQ QUESTIONS 1-9: 0
2. FEELING DOWN, DEPRESSED OR HOPELESS: 0
1. LITTLE INTEREST OR PLEASURE IN DOING THINGS: 0
SUM OF ALL RESPONSES TO PHQ QUESTIONS 1-9: 0
SUM OF ALL RESPONSES TO PHQ QUESTIONS 1-9: 0

## 2024-01-04 NOTE — PATIENT INSTRUCTIONS
Patient Instructions from Today's Visit    Reason for Visit:  Follow Up    Plan:  Bone marrow biopsy results reviewed.  Symptoms reviewed.    Follow Up:  Labs in 2 months.    Recent Lab Results:  Hospital Outpatient Visit on 12/14/2023   Component Date Value Ref Range Status    Sodium 12/14/2023 138  136 - 146 mmol/L Final    Potassium 12/14/2023 3.8  3.5 - 5.1 mmol/L Final    Chloride 12/14/2023 106  103 - 113 mmol/L Final    CO2 12/14/2023 26  21 - 32 mmol/L Final    Anion Gap 12/14/2023 6  2 - 11 mmol/L Final    Glucose 12/14/2023 109 (H)  65 - 100 mg/dL Final    BUN 12/14/2023 11  6 - 23 MG/DL Final    Creatinine 12/14/2023 0.70 (L)  0.8 - 1.5 MG/DL Final    Est, Glom Filt Rate 12/14/2023 >60  >60 ml/min/1.73m2 Final    Comment:    Pediatric calculator link: https://www.kidney.org/professionals/kdoqi/gfr_calculatorped     These results are not intended for use in patients <18 years of age.     eGFR results are calculated without a race factor using  the 2021 CKD-EPI equation. Careful clinical correlation is recommended, particularly when comparing to results calculated using previous equations.  The CKD-EPI equation is less accurate in patients with extremes of muscle mass, extra-renal metabolism of creatinine, excessive creatine ingestion, or following therapy that affects renal tubular secretion.      Calcium 12/14/2023 9.0  8.3 - 10.4 MG/DL Final    Total Bilirubin 12/14/2023 0.8  0.2 - 1.1 MG/DL Final    ALT 12/14/2023 32  12 - 65 U/L Final    AST 12/14/2023 16  15 - 37 U/L Final    Alk Phosphatase 12/14/2023 86  50 - 136 U/L Final    Total Protein 12/14/2023 7.0  6.3 - 8.2 g/dL Final    Albumin 12/14/2023 3.7  3.5 - 5.0 g/dL Final    Globulin 12/14/2023 3.3  2.8 - 4.5 g/dL Final    Albumin/Globulin Ratio 12/14/2023 1.1  0.4 - 1.6   Final    Ferritin 12/14/2023 9  8 - 388 NG/ML Final    Iron 12/14/2023 53  35 - 150 ug/dL Final    Comment: Known Interfering Substances section:  \"Iron values may be falsely

## 2024-03-08 ENCOUNTER — HOSPITAL ENCOUNTER (EMERGENCY)
Age: 54
Discharge: HOME | End: 2024-03-08
Payer: COMMERCIAL

## 2024-03-08 VITALS
HEART RATE: 76 BPM | TEMPERATURE: 100.2 F | OXYGEN SATURATION: 98 % | SYSTOLIC BLOOD PRESSURE: 158 MMHG | DIASTOLIC BLOOD PRESSURE: 97 MMHG | WEIGHT: 182 LBS | RESPIRATION RATE: 18 BRPM | HEIGHT: 67 IN | BODY MASS INDEX: 28.56 KG/M2

## 2024-03-08 DIAGNOSIS — J02.9 ACUTE PHARYNGITIS, UNSPECIFIED ETIOLOGY: Primary | ICD-10-CM

## 2024-03-08 LAB — STREP, MOLECULAR: NOT DETECTED

## 2024-03-08 PROCEDURE — 96372 THER/PROPH/DIAG INJ SC/IM: CPT

## 2024-03-08 PROCEDURE — 99284 EMERGENCY DEPT VISIT MOD MDM: CPT

## 2024-03-08 PROCEDURE — 87651 STREP A DNA AMP PROBE: CPT

## 2024-03-08 PROCEDURE — 6360000002 HC RX W HCPCS: Performed by: STUDENT IN AN ORGANIZED HEALTH CARE EDUCATION/TRAINING PROGRAM

## 2024-03-08 PROCEDURE — 6370000000 HC RX 637 (ALT 250 FOR IP): Performed by: STUDENT IN AN ORGANIZED HEALTH CARE EDUCATION/TRAINING PROGRAM

## 2024-03-08 RX ORDER — AMOXICILLIN 500 MG/1
500 CAPSULE ORAL
Status: COMPLETED | OUTPATIENT
Start: 2024-03-08 | End: 2024-03-08

## 2024-03-08 RX ORDER — AMOXICILLIN AND CLAVULANATE POTASSIUM 875; 125 MG/1; MG/1
1 TABLET, FILM COATED ORAL 2 TIMES DAILY
Qty: 14 TABLET | Refills: 0 | Status: SHIPPED | OUTPATIENT
Start: 2024-03-08 | End: 2024-03-15

## 2024-03-08 RX ORDER — DEXAMETHASONE SODIUM PHOSPHATE 10 MG/ML
10 INJECTION INTRAMUSCULAR; INTRAVENOUS ONCE
Status: COMPLETED | OUTPATIENT
Start: 2024-03-08 | End: 2024-03-08

## 2024-03-08 RX ADMIN — DEXAMETHASONE SODIUM PHOSPHATE 10 MG: 10 INJECTION INTRAMUSCULAR; INTRAVENOUS at 20:00

## 2024-03-08 RX ADMIN — AMOXICILLIN 500 MG: 500 CAPSULE ORAL at 20:00

## 2024-03-08 ASSESSMENT — ENCOUNTER SYMPTOMS
VOMITING: 0
TROUBLE SWALLOWING: 0
ABDOMINAL PAIN: 0
PHOTOPHOBIA: 0
FACIAL SWELLING: 0
SHORTNESS OF BREATH: 0
SORE THROAT: 1
COUGH: 0

## 2024-03-08 ASSESSMENT — PAIN DESCRIPTION - DESCRIPTORS: DESCRIPTORS: SORE

## 2024-03-08 ASSESSMENT — PAIN DESCRIPTION - LOCATION: LOCATION: THROAT

## 2024-03-08 ASSESSMENT — PAIN SCALES - GENERAL: PAINLEVEL_OUTOF10: 5

## 2024-03-08 ASSESSMENT — PAIN - FUNCTIONAL ASSESSMENT: PAIN_FUNCTIONAL_ASSESSMENT: 0-10

## 2024-03-08 NOTE — ED TRIAGE NOTES
Patient ambulatory into ED w/steady gait.  Patient w/son.  Patient is [place]itan[place] speaking w/broken English.  Patient has had a sore throat and fever for 3 days. Denies N/V/D.  No exposure to anyone ill.  Patient took 2 tylenol at noon.  Also took 1 old antibiotic pill.

## 2024-03-09 NOTE — ED NOTES
I have reviewed discharge instructions with the patient.  The patient verbalized understanding.    Patient left ED via Discharge Method: ambulatory to Home with son.    Opportunity for questions and clarification provided.       Patient given 1 scripts.

## 2024-03-09 NOTE — DISCHARGE INSTRUCTIONS
Please take the antibiotics for the full course to treat any bacterial infection.  I recommend alternating tylenol and advil every 4 hours for pain, fever, bodyaches.  Use cough drops, warm salt water gargles for sore throat.  Use flonase nasal spray for congestion.  Drink plenty of fluids and do not allow yourself to get dehydrated.  Continue to monitor your symptoms and return for any new or worsening symptoms.  Otherwise please follow up with your primary care provider.    As we discussed, I did not find a life threatening cause of your symptoms today. However, THAT DOES NOT MEAN IT COULD NOT DEVELOP. If you develop ANY new or worsening symptoms, it is critical that you return for re-evaluation. This includes any symptoms that are concerning to you, especially symptoms such as difficulty swallowing, drooling, difficulty fully opening her mouth, high fever unresponsive to medication.  If you do not return for re-evaluation, you risk serious complications, including death.

## 2024-03-09 NOTE — ED PROVIDER NOTES
Emergency Department Provider Note       PCP: Smitha Farmer APRN - CNP   Age: 54 y.o.   Sex: male     DISPOSITION Discharge - Pending Orders Complete 03/08/2024 07:37:04 PM       ICD-10-CM    1. Acute pharyngitis, unspecified etiology  J02.9           Medical Decision Making     In summary this is a 54-year-old male patient who presented for evaluation of acute pharyngitis. Based on my evaluation I feel the patient is at low risk for alternative causes such as peritonsillar abscess, retropharyngeal abscess, epiglottitis, Ludwigs angina. The reasoning behind my decision process is that the patient is overall well-appearing in the exam room, not tripoding, and in no acute distress.  Vitals remarkable for low-grade temperature but otherwise without tachycardia or tachypnea.  The patient does not exhibit any muffled voice, uvular deviation, trismus. The patient has good dentition, is not toxic appearing, and is not drooling. The patient is speaking in full sentences without increased work of breathing. There is no evidence of submandibular swelling or edema on the floor of the mouth. Lungs are clear to auscultation and the patient does not exhibit any stridor.  Although strep was negative, we'll treat the patient with oral antibiotics.  This testing is notably inaccurate and I do feel he has a bacterial infection.  The patient was instructed of signs and symptoms to monitor for and instructed to encourage supportive care. The follow up for this patient will be on an as needed basis if symptoms worsen, persist, change. I have specifically counseled the patient on warning signs to return immediately for including but not limited to difficulty swallowing, respiratory difficulty. The patient has verbalized understanding and is in agreement with the treatment plan.       1 acute, uncomplicated illness or injury.  Over the counter drug management performed.  Prescription drug management performed.  Patient was

## 2024-04-05 ENCOUNTER — HOSPITAL ENCOUNTER (OUTPATIENT)
Dept: LAB | Age: 54
Discharge: HOME | End: 2024-04-05

## 2024-04-05 DIAGNOSIS — D47.1 MYELOPROLIFERATIVE DISORDER (HCC): ICD-10-CM

## 2024-04-05 LAB
ALBUMIN SERPL-MCNC: 4 G/DL (ref 3.5–5)
ALBUMIN/GLOB SERPL: 1.2 (ref 0.4–1.6)
ALP SERPL-CCNC: 82 U/L (ref 50–136)
ALT SERPL-CCNC: 37 U/L (ref 12–65)
ANION GAP SERPL CALC-SCNC: 4 MMOL/L (ref 2–11)
AST SERPL-CCNC: 23 U/L (ref 15–37)
BASOPHILS # BLD: 0.1 K/UL (ref 0–0.2)
BASOPHILS NFR BLD: 1 % (ref 0–2)
BILIRUB SERPL-MCNC: 0.7 MG/DL (ref 0.2–1.1)
BUN SERPL-MCNC: 13 MG/DL (ref 6–23)
CALCIUM SERPL-MCNC: 9 MG/DL (ref 8.3–10.4)
CHLORIDE SERPL-SCNC: 107 MMOL/L (ref 103–113)
CO2 SERPL-SCNC: 28 MMOL/L (ref 21–32)
CREAT SERPL-MCNC: 0.9 MG/DL (ref 0.8–1.5)
DIFFERENTIAL METHOD BLD: ABNORMAL
EOSINOPHIL # BLD: 0.3 K/UL (ref 0–0.8)
EOSINOPHIL NFR BLD: 3 % (ref 0.5–7.8)
ERYTHROCYTE [DISTWIDTH] IN BLOOD BY AUTOMATED COUNT: 16.6 % (ref 11.9–14.6)
GLOBULIN SER CALC-MCNC: 3.4 G/DL (ref 2.8–4.5)
GLUCOSE SERPL-MCNC: 107 MG/DL (ref 65–100)
HCT VFR BLD AUTO: 53.5 % (ref 41.1–50.3)
HGB BLD-MCNC: 16.8 G/DL (ref 13.6–17.2)
IMM GRANULOCYTES # BLD AUTO: 0.1 K/UL (ref 0–0.5)
IMM GRANULOCYTES NFR BLD AUTO: 1 % (ref 0–5)
LYMPHOCYTES # BLD: 1.8 K/UL (ref 0.5–4.6)
LYMPHOCYTES NFR BLD: 23 % (ref 13–44)
MCH RBC QN AUTO: 24.6 PG (ref 26.1–32.9)
MCHC RBC AUTO-ENTMCNC: 31.4 G/DL (ref 31.4–35)
MCV RBC AUTO: 78.2 FL (ref 82–102)
MONOCYTES # BLD: 0.5 K/UL (ref 0.1–1.3)
MONOCYTES NFR BLD: 7 % (ref 4–12)
NEUTS SEG # BLD: 5 K/UL (ref 1.7–8.2)
NEUTS SEG NFR BLD: 65 % (ref 43–78)
NRBC # BLD: 0 K/UL (ref 0–0.2)
PLATELET # BLD AUTO: 617 K/UL (ref 150–450)
PMV BLD AUTO: 8.9 FL (ref 9.4–12.3)
POTASSIUM SERPL-SCNC: 4.2 MMOL/L (ref 3.5–5.1)
PROT SERPL-MCNC: 7.4 G/DL (ref 6.3–8.2)
RBC # BLD AUTO: 6.84 M/UL (ref 4.23–5.6)
SODIUM SERPL-SCNC: 139 MMOL/L (ref 136–146)
WBC # BLD AUTO: 7.7 K/UL (ref 4.3–11.1)

## 2024-04-05 PROCEDURE — 85025 COMPLETE CBC W/AUTO DIFF WBC: CPT

## 2024-04-05 PROCEDURE — 80053 COMPREHEN METABOLIC PANEL: CPT

## 2024-04-05 PROCEDURE — 36415 COLL VENOUS BLD VENIPUNCTURE: CPT

## 2024-04-08 ENCOUNTER — TELEPHONE (OUTPATIENT)
Dept: ONCOLOGY | Age: 54
End: 2024-04-08

## 2024-04-08 NOTE — TELEPHONE ENCOUNTER
Call to pt with Simba Lund ID #[id number].  Reviewed recs to p/w phlebotomy at this time per Dr. Rivas's recommendations.  Also rec pt continue with ASA.  Pt confirms he is still taking ASA and will continue.  Pt agreeable to being set up for phleb session at our infusion center.  Msg sent to infusion schedulers to schedule pt.

## 2024-04-16 ENCOUNTER — HOSPITAL ENCOUNTER (OUTPATIENT)
Dept: INFUSION THERAPY | Age: 54
Setting detail: INFUSION SERIES
Discharge: HOME | End: 2024-04-16
Payer: COMMERCIAL

## 2024-04-16 ENCOUNTER — HOSPITAL ENCOUNTER (OUTPATIENT)
Dept: LAB | Age: 54
Discharge: HOME | End: 2024-04-19
Payer: COMMERCIAL

## 2024-04-16 VITALS
TEMPERATURE: 97.4 F | SYSTOLIC BLOOD PRESSURE: 121 MMHG | DIASTOLIC BLOOD PRESSURE: 80 MMHG | RESPIRATION RATE: 18 BRPM | OXYGEN SATURATION: 97 % | HEART RATE: 73 BPM

## 2024-04-16 DIAGNOSIS — D75.1 POLYCYTHEMIA: Primary | ICD-10-CM

## 2024-04-16 DIAGNOSIS — D47.1 MYELOPROLIFERATIVE DISORDER (HCC): ICD-10-CM

## 2024-04-16 LAB
ALBUMIN SERPL-MCNC: 3.9 G/DL (ref 3.5–5)
ALBUMIN/GLOB SERPL: 1.2 (ref 0.4–1.6)
ALP SERPL-CCNC: 78 U/L (ref 50–136)
ALT SERPL-CCNC: 55 U/L (ref 12–65)
ANION GAP SERPL CALC-SCNC: 7 MMOL/L (ref 2–11)
AST SERPL-CCNC: 44 U/L (ref 15–37)
BASOPHILS # BLD: 0.1 K/UL (ref 0–0.2)
BASOPHILS NFR BLD: 1 % (ref 0–2)
BILIRUB SERPL-MCNC: 0.5 MG/DL (ref 0.2–1.1)
BUN SERPL-MCNC: 18 MG/DL (ref 6–23)
CALCIUM SERPL-MCNC: 9 MG/DL (ref 8.3–10.4)
CHLORIDE SERPL-SCNC: 105 MMOL/L (ref 103–113)
CO2 SERPL-SCNC: 26 MMOL/L (ref 21–32)
CREAT SERPL-MCNC: 0.9 MG/DL (ref 0.8–1.5)
DIFFERENTIAL METHOD BLD: ABNORMAL
EOSINOPHIL # BLD: 0.2 K/UL (ref 0–0.8)
EOSINOPHIL NFR BLD: 3 % (ref 0.5–7.8)
ERYTHROCYTE [DISTWIDTH] IN BLOOD BY AUTOMATED COUNT: 16.3 % (ref 11.9–14.6)
GLOBULIN SER CALC-MCNC: 3.3 G/DL (ref 2.8–4.5)
GLUCOSE SERPL-MCNC: 105 MG/DL (ref 65–100)
HCT VFR BLD AUTO: 51.3 % (ref 41.1–50.3)
HGB BLD-MCNC: 16.4 G/DL (ref 13.6–17.2)
IMM GRANULOCYTES # BLD AUTO: 0.1 K/UL (ref 0–0.5)
IMM GRANULOCYTES NFR BLD AUTO: 1 % (ref 0–5)
LYMPHOCYTES # BLD: 1.7 K/UL (ref 0.5–4.6)
LYMPHOCYTES NFR BLD: 23 % (ref 13–44)
MCH RBC QN AUTO: 25.1 PG (ref 26.1–32.9)
MCHC RBC AUTO-ENTMCNC: 32 G/DL (ref 31.4–35)
MCV RBC AUTO: 78.6 FL (ref 82–102)
MONOCYTES # BLD: 0.5 K/UL (ref 0.1–1.3)
MONOCYTES NFR BLD: 7 % (ref 4–12)
NEUTS SEG # BLD: 4.6 K/UL (ref 1.7–8.2)
NEUTS SEG NFR BLD: 65 % (ref 43–78)
NRBC # BLD: 0 K/UL (ref 0–0.2)
PLATELET # BLD AUTO: 578 K/UL (ref 150–450)
PMV BLD AUTO: 9.2 FL (ref 9.4–12.3)
POTASSIUM SERPL-SCNC: 3.9 MMOL/L (ref 3.5–5.1)
PROT SERPL-MCNC: 7.2 G/DL (ref 6.3–8.2)
RBC # BLD AUTO: 6.53 M/UL (ref 4.23–5.6)
SODIUM SERPL-SCNC: 138 MMOL/L (ref 136–146)
WBC # BLD AUTO: 7.1 K/UL (ref 4.3–11.1)

## 2024-04-16 PROCEDURE — 99195 PHLEBOTOMY: CPT

## 2024-04-16 PROCEDURE — 36415 COLL VENOUS BLD VENIPUNCTURE: CPT

## 2024-04-16 PROCEDURE — 80053 COMPREHEN METABOLIC PANEL: CPT

## 2024-04-16 PROCEDURE — 2580000003 HC RX 258: Performed by: INTERNAL MEDICINE

## 2024-04-16 PROCEDURE — 85025 COMPLETE CBC W/AUTO DIFF WBC: CPT

## 2024-04-16 PROCEDURE — 2580000003 HC RX 258: Performed by: NURSE PRACTITIONER

## 2024-04-16 RX ORDER — SODIUM CHLORIDE 0.9 % (FLUSH) 0.9 %
5-40 SYRINGE (ML) INJECTION PRN
Status: DISCONTINUED | OUTPATIENT
Start: 2024-04-16 | End: 2024-04-17 | Stop reason: HOSPADM

## 2024-04-16 RX ORDER — 0.9 % SODIUM CHLORIDE 0.9 %
500 INTRAVENOUS SOLUTION INTRAVENOUS ONCE
Status: CANCELLED | OUTPATIENT
Start: 2024-04-16 | End: 2024-04-16

## 2024-04-16 RX ORDER — 0.9 % SODIUM CHLORIDE 0.9 %
250 INTRAVENOUS SOLUTION INTRAVENOUS ONCE
OUTPATIENT
Start: 2024-04-16 | End: 2024-04-16

## 2024-04-16 RX ORDER — 0.9 % SODIUM CHLORIDE 0.9 %
500 INTRAVENOUS SOLUTION INTRAVENOUS ONCE
Status: COMPLETED | OUTPATIENT
Start: 2024-04-16 | End: 2024-04-16

## 2024-04-16 RX ORDER — 0.9 % SODIUM CHLORIDE 0.9 %
250 INTRAVENOUS SOLUTION INTRAVENOUS ONCE
Status: CANCELLED | OUTPATIENT
Start: 2024-04-16 | End: 2024-04-16

## 2024-04-16 RX ADMIN — SODIUM CHLORIDE 500 ML: 9 INJECTION, SOLUTION INTRAVENOUS at 07:59

## 2024-04-16 RX ADMIN — SODIUM CHLORIDE, PRESERVATIVE FREE 10 ML: 5 INJECTION INTRAVENOUS at 07:49

## 2024-04-16 NOTE — PROGRESS NOTES
Patient ambulatory to infusion. Lab results noted. Therapeutic phlebotomy completed. 500cc whole blood drawn. Replaced with 500 cc IVF. VSS. Patient declined snack and beverage. Discharged ambulatory. Patient aware of follow up appt with Dr. Rivas.

## 2024-05-07 NOTE — PROGRESS NOTES
[facility] Hematology and Oncology: Established patient - follow up     Chief Complaint   Patient presents with    Follow-up     Diagnosis - polycythemia vera     Reason for Referral: Elevated red blood cell count  Referring Provider:  RIOS Titus CNP  Family History of Cancer/Hematologic Disorders: Family history is significant for patient's brother with polycythemia.   Presenting Symptoms: No relevant physical symptoms reported.     History of Present Illness:  Mr. Reyes is a 54 y.o. male who presents today for follow up regarding polycythemia.  The past medical history is significant for rheumatoid arthritis, hyperkalemia, WILI, depression, elevated blood sugar, elevated cholesterol, and lipoma removal.  He reported a diagnosis of hemochromatosis to PCP for which he donates blood every 2 months.  He was most recently seen by his PCP for routine follow-up on 10/27/22.  Review of systems and physical exam were unremarkable.  Labs from same day: RBC 6.20, HGB 11.9, MCV 68.5, MCH 19.2, MCHC 28.0, RDW 19.0, and .  Patient reported to PCP that his brother also has polycythemia.  He denied any clotting issues, bruising, or abdominal pain.  He continues to donate blood [facility] every 2 months.  After review of labs, PCP noted, “Referred to hematologist. Would like to just make sure that there is no other treatment needed for his polycythemia.”  - At consultation, he was here with his daughter.  Pt was not sure of his diagnosis.  He did not know if he has PV or hemochromatosis.  Daughter is here with him, and she does not know etiology of his original polycythemia.  Pt has been donating blood leading to microcytic anemia.  Will need to complete further workup before final recs are made.  I recommend holding off on phlebotomy at this time, he reported episodes of dizziness and fatigue.  He wishes to p/w further workup at this time.    No HFE.    - here for FU.  Labs c/w MPN.  [employer]

## 2024-05-10 ENCOUNTER — OFFICE VISIT (OUTPATIENT)
Dept: ONCOLOGY | Age: 54
End: 2024-05-10
Payer: COMMERCIAL

## 2024-05-10 VITALS
HEART RATE: 74 BPM | OXYGEN SATURATION: 99 % | HEIGHT: 67 IN | BODY MASS INDEX: 30.13 KG/M2 | RESPIRATION RATE: 16 BRPM | WEIGHT: 192 LBS | TEMPERATURE: 98 F | DIASTOLIC BLOOD PRESSURE: 91 MMHG | SYSTOLIC BLOOD PRESSURE: 128 MMHG

## 2024-05-10 DIAGNOSIS — D47.1 MYELOPROLIFERATIVE DISORDER (HCC): ICD-10-CM

## 2024-05-10 DIAGNOSIS — D75.839 THROMBOCYTOSIS: ICD-10-CM

## 2024-05-10 DIAGNOSIS — D45 POLYCYTHEMIA VERA (HCC): Primary | ICD-10-CM

## 2024-05-10 DIAGNOSIS — Z15.89 JAK2 V617F MUTATION: ICD-10-CM

## 2024-05-10 PROCEDURE — 99213 OFFICE O/P EST LOW 20 MIN: CPT | Performed by: INTERNAL MEDICINE

## 2024-05-10 RX ORDER — AMOXICILLIN 500 MG/1
CAPSULE ORAL
COMMUNITY
Start: 2024-04-24

## 2024-05-10 ASSESSMENT — PATIENT HEALTH QUESTIONNAIRE - PHQ9
SUM OF ALL RESPONSES TO PHQ QUESTIONS 1-9: 0
2. FEELING DOWN, DEPRESSED OR HOPELESS: NOT AT ALL
1. LITTLE INTEREST OR PLEASURE IN DOING THINGS: NOT AT ALL
SUM OF ALL RESPONSES TO PHQ QUESTIONS 1-9: 0
SUM OF ALL RESPONSES TO PHQ9 QUESTIONS 1 & 2: 0

## 2024-05-10 NOTE — PATIENT INSTRUCTIONS
Patient Information from Today's Visit    The members of your [facility] are listed below:    Physician Provider: Lesa Rivas, Medical Oncologist  Advanced Practice Clinician: Sandra Garcia NP  Registered Nurse: Mary ENCARNACION RN  Nurse Navigator: N/A  Medical Assistant: Esther OLIVA CMA  :Ruchi SAM  Supportive Care Services: N/A    Follow Up Instructions: 2-3 weeks for labs only.    Labs reviewed.  Symptoms reviewed.    Treatment Summary has been discussed and given to patient:N/A      Current Labs:   Hospital Outpatient Visit on 04/16/2024   Component Date Value Ref Range Status    Sodium 04/16/2024 138  136 - 146 mmol/L Final    Potassium 04/16/2024 3.9  3.5 - 5.1 mmol/L Final    Chloride 04/16/2024 105  103 - 113 mmol/L Final    CO2 04/16/2024 26  21 - 32 mmol/L Final    Anion Gap 04/16/2024 7  2 - 11 mmol/L Final    Glucose 04/16/2024 105 (H)  65 - 100 mg/dL Final    BUN 04/16/2024 18  6 - 23 MG/DL Final    Creatinine 04/16/2024 0.90  0.8 - 1.5 MG/DL Final    Est, Glom Filt Rate 04/16/2024 >90  >60 ml/min/1.73m2 Final    Comment:    Pediatric calculator link: https://www.kidney.org/professionals/kdoqi/gfr_calculatorped     These results are not intended for use in patients <18 years of age.     eGFR results are calculated without a race factor using  the 2021 CKD-EPI equation. Careful clinical correlation is recommended, particularly when comparing to results calculated using previous equations.  The CKD-EPI equation is less accurate in patients with extremes of muscle mass, extra-renal metabolism of creatinine, excessive creatine ingestion, or following therapy that affects renal tubular secretion.      Calcium 04/16/2024 9.0  8.3 - 10.4 MG/DL Final    Total Bilirubin 04/16/2024 0.5  0.2 - 1.1 MG/DL Final    ALT 04/16/2024 55  12 - 65 U/L Final    AST 04/16/2024 44 (H)  15 - 37 U/L Final    Alk Phosphatase 04/16/2024 78  50 - 136 U/L Final    Total Protein 04/16/2024 7.2  6.3 - 8.2

## 2024-05-16 PROBLEM — D45 POLYCYTHEMIA VERA (HCC): Status: ACTIVE | Noted: 2023-04-23

## 2024-05-24 ENCOUNTER — HOSPITAL ENCOUNTER (OUTPATIENT)
Dept: LAB | Age: 54
Discharge: HOME | End: 2024-05-24
Payer: COMMERCIAL

## 2024-05-24 DIAGNOSIS — D47.1 MYELOPROLIFERATIVE DISORDER (HCC): ICD-10-CM

## 2024-05-24 LAB
ALBUMIN SERPL-MCNC: 3.8 G/DL (ref 3.5–5)
ALBUMIN/GLOB SERPL: 1.4 (ref 1–1.9)
ALP SERPL-CCNC: 75 U/L (ref 40–129)
ALT SERPL-CCNC: 34 U/L (ref 12–65)
ANION GAP SERPL CALC-SCNC: 7 MMOL/L (ref 9–18)
AST SERPL-CCNC: 31 U/L (ref 15–37)
BASOPHILS # BLD: 0.1 K/UL (ref 0–0.2)
BASOPHILS NFR BLD: 1 % (ref 0–2)
BILIRUB SERPL-MCNC: 0.6 MG/DL (ref 0–1.2)
BUN SERPL-MCNC: 18 MG/DL (ref 6–23)
CALCIUM SERPL-MCNC: 8.8 MG/DL (ref 8.8–10.2)
CHLORIDE SERPL-SCNC: 106 MMOL/L (ref 98–107)
CO2 SERPL-SCNC: 28 MMOL/L (ref 20–28)
CREAT SERPL-MCNC: 0.8 MG/DL (ref 0.8–1.3)
DIFFERENTIAL METHOD BLD: ABNORMAL
EOSINOPHIL # BLD: 0.3 K/UL (ref 0–0.8)
EOSINOPHIL NFR BLD: 4 % (ref 0.5–7.8)
ERYTHROCYTE [DISTWIDTH] IN BLOOD BY AUTOMATED COUNT: 16 % (ref 11.9–14.6)
FERRITIN SERPL-MCNC: 17 NG/ML (ref 8–388)
GLOBULIN SER CALC-MCNC: 2.8 G/DL (ref 2.3–3.5)
GLUCOSE SERPL-MCNC: 102 MG/DL (ref 70–99)
HCT VFR BLD AUTO: 47.8 % (ref 41.1–50.3)
HGB BLD-MCNC: 15.1 G/DL (ref 13.6–17.2)
IMM GRANULOCYTES # BLD AUTO: 0.1 K/UL (ref 0–0.5)
IMM GRANULOCYTES NFR BLD AUTO: 1 % (ref 0–5)
IRON SATN MFR SERPL: 16 % (ref 20–50)
IRON SERPL-MCNC: 63 UG/DL (ref 35–100)
LYMPHOCYTES # BLD: 2.1 K/UL (ref 0.5–4.6)
LYMPHOCYTES NFR BLD: 28 % (ref 13–44)
MCH RBC QN AUTO: 24.8 PG (ref 26.1–32.9)
MCHC RBC AUTO-ENTMCNC: 31.6 G/DL (ref 31.4–35)
MCV RBC AUTO: 78.5 FL (ref 82–102)
MONOCYTES # BLD: 0.6 K/UL (ref 0.1–1.3)
MONOCYTES NFR BLD: 8 % (ref 4–12)
NEUTS SEG # BLD: 4.2 K/UL (ref 1.7–8.2)
NEUTS SEG NFR BLD: 58 % (ref 43–78)
NRBC # BLD: 0 K/UL (ref 0–0.2)
PLATELET # BLD AUTO: 578 K/UL (ref 150–450)
PMV BLD AUTO: 9.1 FL (ref 9.4–12.3)
POTASSIUM SERPL-SCNC: 4.7 MMOL/L (ref 3.5–5.1)
PROT SERPL-MCNC: 6.6 G/DL (ref 6.3–8.2)
RBC # BLD AUTO: 6.09 M/UL (ref 4.23–5.6)
SODIUM SERPL-SCNC: 141 MMOL/L (ref 136–145)
TIBC SERPL-MCNC: 387 UG/DL (ref 240–450)
UIBC SERPL-MCNC: 324 UG/DL (ref 112–347)
WBC # BLD AUTO: 7.2 K/UL (ref 4.3–11.1)

## 2024-05-24 PROCEDURE — 83540 ASSAY OF IRON: CPT

## 2024-05-24 PROCEDURE — 36415 COLL VENOUS BLD VENIPUNCTURE: CPT

## 2024-05-24 PROCEDURE — 83550 IRON BINDING TEST: CPT

## 2024-05-24 PROCEDURE — 80053 COMPREHEN METABOLIC PANEL: CPT

## 2024-05-24 PROCEDURE — 82728 ASSAY OF FERRITIN: CPT

## 2024-05-24 PROCEDURE — 85025 COMPLETE CBC W/AUTO DIFF WBC: CPT

## 2024-06-04 ENCOUNTER — HOSPITAL ENCOUNTER (OUTPATIENT)
Dept: INFUSION THERAPY | Age: 54
Setting detail: INFUSION SERIES
End: 2024-06-04

## 2024-06-06 ENCOUNTER — HOSPITAL ENCOUNTER (OUTPATIENT)
Dept: LAB | Age: 54
Discharge: HOME | End: 2024-06-06
Payer: COMMERCIAL

## 2024-06-06 DIAGNOSIS — D45 POLYCYTHEMIA VERA (HCC): ICD-10-CM

## 2024-06-06 LAB
ANION GAP SERPL CALC-SCNC: 9 MMOL/L (ref 9–18)
BUN SERPL-MCNC: 15 MG/DL (ref 6–23)
CALCIUM SERPL-MCNC: 9.3 MG/DL (ref 8.8–10.2)
CHLORIDE SERPL-SCNC: 105 MMOL/L (ref 98–107)
CO2 SERPL-SCNC: 27 MMOL/L (ref 20–28)
CREAT SERPL-MCNC: 0.79 MG/DL (ref 0.8–1.3)
ERYTHROCYTE [DISTWIDTH] IN BLOOD BY AUTOMATED COUNT: 17.1 % (ref 11.9–14.6)
GLUCOSE SERPL-MCNC: 103 MG/DL (ref 70–99)
HCT VFR BLD AUTO: 49.1 % (ref 41.1–50.3)
HGB BLD-MCNC: 15.3 G/DL (ref 13.6–17.2)
MCH RBC QN AUTO: 24.6 PG (ref 26.1–32.9)
MCHC RBC AUTO-ENTMCNC: 31.2 G/DL (ref 31.4–35)
MCV RBC AUTO: 78.9 FL (ref 82–102)
NRBC # BLD: 0 K/UL (ref 0–0.2)
PLATELET # BLD AUTO: 541 K/UL (ref 150–450)
PMV BLD AUTO: 9 FL (ref 9.4–12.3)
POTASSIUM SERPL-SCNC: 4.6 MMOL/L (ref 3.5–5.1)
RBC # BLD AUTO: 6.22 M/UL (ref 4.23–5.6)
SODIUM SERPL-SCNC: 141 MMOL/L (ref 136–145)
WBC # BLD AUTO: 7.7 K/UL (ref 4.3–11.1)

## 2024-06-06 PROCEDURE — 36415 COLL VENOUS BLD VENIPUNCTURE: CPT

## 2024-06-06 PROCEDURE — 85027 COMPLETE CBC AUTOMATED: CPT

## 2024-06-06 PROCEDURE — 80048 BASIC METABOLIC PNL TOTAL CA: CPT

## 2024-07-12 ENCOUNTER — TELEPHONE (OUTPATIENT)
Dept: INFUSION THERAPY | Age: 54
End: 2024-07-12

## 2024-07-12 NOTE — TELEPHONE ENCOUNTER
Received this patient to schedule for Infusion. This patient has [employer] which is not accepted at this facility. Cancelled Infusion, called patient and let them know the insurance was not accepted here.  Informed Clinical staff of this issue.

## 2024-07-23 ASSESSMENT — ENCOUNTER SYMPTOMS
SHORTNESS OF BREATH: 0
SCLERAL ICTERUS: 0
ABDOMINAL PAIN: 0
ABDOMINAL DISTENTION: 0
NAUSEA: 0
SORE THROAT: 0
CONSTIPATION: 0
DIARRHEA: 0
BLOOD IN STOOL: 0
EYE PROBLEMS: 0
VOMITING: 0
COUGH: 0
HEMOPTYSIS: 0
BACK PAIN: 0

## 2024-07-23 NOTE — PROGRESS NOTES
[facility] Hematology and Oncology: Established patient - follow up     Chief Complaint   Patient presents with    Follow-up     Diagnosis - polycythemia vera   Reason for Referral: Elevated red blood cell count  Referring Provider:  RIOS Titus CNP  Family History of Cancer/Hematologic Disorders: Family history is significant for patient's brother with polycythemia.   Presenting Symptoms: No relevant physical symptoms reported.     History of Present Illness:  Mr. Reyes is a 54 y.o. male who presents today for follow up regarding polycythemia.  The past medical history is significant for rheumatoid arthritis, hyperkalemia, WILI, depression, elevated blood sugar, elevated cholesterol, and lipoma removal.  He reported a diagnosis of hemochromatosis to PCP for which he donates blood every 2 months.  He was most recently seen by his PCP for routine follow-up on 10/27/22.  Review of systems and physical exam were unremarkable.  Labs from same day: RBC 6.20, HGB 11.9, MCV 68.5, MCH 19.2, MCHC 28.0, RDW 19.0, and .  Patient reported to PCP that his brother also has polycythemia.  He denied any clotting issues, bruising, or abdominal pain.  He continues to [facility] blood [facility] 2 months.  After review of labs, PCP noted, “Referred to hematologist. Would like to just make sure that there is no other treatment needed for his polycythemia.”  - At consultation, he was here with his daughter.  Pt was not sure of his diagnosis.  He did not know if he has PV or hemochromatosis.  Daughter is here with him, and she does not know etiology of his original polycythemia.  Pt has been donating blood leading to microcytic anemia.  Will need to complete further workup before final recs are made.  I recommend holding off on phlebotomy at this time, he reported episodes of dizziness and fatigue.  He wishes to p/w further workup at this time.    No HFE.    - here for FU.  Labs c/w MPN.  [employer]

## 2024-07-26 ENCOUNTER — HOSPITAL ENCOUNTER (OUTPATIENT)
Dept: LAB | Age: 54
Discharge: HOME | End: 2024-07-26

## 2024-07-26 ENCOUNTER — CLINICAL DOCUMENTATION (OUTPATIENT)
Dept: INFUSION THERAPY | Age: 54
End: 2024-07-26

## 2024-07-26 ENCOUNTER — OFFICE VISIT (OUTPATIENT)
Dept: ONCOLOGY | Age: 54
End: 2024-07-26

## 2024-07-26 VITALS
BODY MASS INDEX: 30.76 KG/M2 | HEIGHT: 67 IN | OXYGEN SATURATION: 98 % | RESPIRATION RATE: 16 BRPM | TEMPERATURE: 97.6 F | HEART RATE: 60 BPM | SYSTOLIC BLOOD PRESSURE: 149 MMHG | DIASTOLIC BLOOD PRESSURE: 89 MMHG | WEIGHT: 196 LBS

## 2024-07-26 DIAGNOSIS — D45 POLYCYTHEMIA VERA (HCC): ICD-10-CM

## 2024-07-26 DIAGNOSIS — D45 POLYCYTHEMIA VERA (HCC): Primary | ICD-10-CM

## 2024-07-26 LAB
ALBUMIN SERPL-MCNC: 3.9 G/DL (ref 3.5–5)
ALBUMIN/GLOB SERPL: 1.3 (ref 1–1.9)
ALP SERPL-CCNC: 87 U/L (ref 40–129)
ALT SERPL-CCNC: 35 U/L (ref 12–65)
ANION GAP SERPL CALC-SCNC: 10 MMOL/L (ref 9–18)
AST SERPL-CCNC: 28 U/L (ref 15–37)
BASOPHILS # BLD: 0.1 K/UL (ref 0–0.2)
BASOPHILS NFR BLD: 1 % (ref 0–2)
BILIRUB SERPL-MCNC: 0.3 MG/DL (ref 0–1.2)
BUN SERPL-MCNC: 10 MG/DL (ref 6–23)
CALCIUM SERPL-MCNC: 9.1 MG/DL (ref 8.8–10.2)
CHLORIDE SERPL-SCNC: 104 MMOL/L (ref 98–107)
CO2 SERPL-SCNC: 24 MMOL/L (ref 20–28)
CREAT SERPL-MCNC: 0.72 MG/DL (ref 0.8–1.3)
DIFFERENTIAL METHOD BLD: ABNORMAL
EOSINOPHIL # BLD: 0.2 K/UL (ref 0–0.8)
EOSINOPHIL NFR BLD: 3 % (ref 0.5–7.8)
ERYTHROCYTE [DISTWIDTH] IN BLOOD BY AUTOMATED COUNT: 18.3 % (ref 11.9–14.6)
FERRITIN SERPL-MCNC: 25 NG/ML (ref 8–388)
GLOBULIN SER CALC-MCNC: 3 G/DL (ref 2.3–3.5)
GLUCOSE SERPL-MCNC: 104 MG/DL (ref 70–99)
HCT VFR BLD AUTO: 52.8 % (ref 41.1–50.3)
HGB BLD-MCNC: 17.1 G/DL (ref 13.6–17.2)
IMM GRANULOCYTES # BLD AUTO: 0.1 K/UL (ref 0–0.5)
IMM GRANULOCYTES NFR BLD AUTO: 1 % (ref 0–5)
IRON SATN MFR SERPL: 9 % (ref 20–50)
IRON SERPL-MCNC: 35 UG/DL (ref 35–100)
LYMPHOCYTES # BLD: 1.6 K/UL (ref 0.5–4.6)
LYMPHOCYTES NFR BLD: 25 % (ref 13–44)
MCH RBC QN AUTO: 26.2 PG (ref 26.1–32.9)
MCHC RBC AUTO-ENTMCNC: 32.4 G/DL (ref 31.4–35)
MCV RBC AUTO: 81 FL (ref 82–102)
MONOCYTES # BLD: 0.4 K/UL (ref 0.1–1.3)
MONOCYTES NFR BLD: 6 % (ref 4–12)
NEUTS SEG # BLD: 4.1 K/UL (ref 1.7–8.2)
NEUTS SEG NFR BLD: 64 % (ref 43–78)
NRBC # BLD: 0 K/UL (ref 0–0.2)
PLATELET # BLD AUTO: 553 K/UL (ref 150–450)
PMV BLD AUTO: 9.3 FL (ref 9.4–12.3)
POTASSIUM SERPL-SCNC: 4.7 MMOL/L (ref 3.5–5.1)
PROT SERPL-MCNC: 6.9 G/DL (ref 6.3–8.2)
RBC # BLD AUTO: 6.52 M/UL (ref 4.23–5.6)
SODIUM SERPL-SCNC: 138 MMOL/L (ref 136–145)
TIBC SERPL-MCNC: 378 UG/DL (ref 240–450)
UIBC SERPL-MCNC: 343 UG/DL (ref 112–347)
WBC # BLD AUTO: 6.4 K/UL (ref 4.3–11.1)

## 2024-07-26 PROCEDURE — 83550 IRON BINDING TEST: CPT

## 2024-07-26 PROCEDURE — 82728 ASSAY OF FERRITIN: CPT

## 2024-07-26 PROCEDURE — 36415 COLL VENOUS BLD VENIPUNCTURE: CPT

## 2024-07-26 PROCEDURE — 80053 COMPREHEN METABOLIC PANEL: CPT

## 2024-07-26 PROCEDURE — 85025 COMPLETE CBC W/AUTO DIFF WBC: CPT

## 2024-07-26 PROCEDURE — 83540 ASSAY OF IRON: CPT

## 2024-07-26 RX ORDER — ASPIRIN 81 MG/1
81 TABLET ORAL DAILY
COMMUNITY

## 2024-07-26 NOTE — PROGRESS NOTES
I spoke with Narciso Reyes with an . Narciso Reyes is willing to speak with his  to enroll into a different [employer] Insurance Plan that is in-network with our agency. Patient will inform us when the enrollment change is completed.

## 2024-07-26 NOTE — PATIENT INSTRUCTIONS
call [phone].    Please notify your assigned Nurse Navigator of any unplanned hospital admissions or Emergency Room visits within 24 hours of discharge.    -------------------------------------------------------------------------------------------------------------------  Please call our office at [phone] if you have any  of the following symptoms:   Fever of 100.5 or greater  Chills  Shortness of breath  Swelling or pain in one leg    After office hours an answering service is available and will contact a provider for emergencies or if you are experiencing any of the above symptoms.        CAITLIN GREGG RN

## 2024-07-29 ENCOUNTER — HOSPITAL ENCOUNTER (OUTPATIENT)
Dept: INFUSION THERAPY | Age: 54
Setting detail: INFUSION SERIES
End: 2024-07-29

## 2024-08-12 ENCOUNTER — HOSPITAL ENCOUNTER (OUTPATIENT)
Dept: INFUSION THERAPY | Age: 54
Setting detail: INFUSION SERIES
End: 2024-08-12

## 2024-08-13 ENCOUNTER — HOSPITAL ENCOUNTER (OUTPATIENT)
Dept: LAB | Age: 54
Discharge: HOME | End: 2024-08-16
Payer: COMMERCIAL

## 2024-08-13 ENCOUNTER — HOSPITAL ENCOUNTER (OUTPATIENT)
Dept: INFUSION THERAPY | Age: 54
Discharge: HOME | End: 2024-08-13
Payer: COMMERCIAL

## 2024-08-13 VITALS
RESPIRATION RATE: 16 BRPM | OXYGEN SATURATION: 96 % | DIASTOLIC BLOOD PRESSURE: 87 MMHG | HEART RATE: 70 BPM | SYSTOLIC BLOOD PRESSURE: 164 MMHG | TEMPERATURE: 97.5 F

## 2024-08-13 DIAGNOSIS — D45 POLYCYTHEMIA VERA (HCC): Primary | ICD-10-CM

## 2024-08-13 LAB
BASOPHILS # BLD: 0.1 K/UL (ref 0–0.2)
BASOPHILS NFR BLD: 1 % (ref 0–2)
DIFFERENTIAL METHOD BLD: ABNORMAL
EOSINOPHIL # BLD: 0.3 K/UL (ref 0–0.8)
EOSINOPHIL NFR BLD: 4 % (ref 0.5–7.8)
ERYTHROCYTE [DISTWIDTH] IN BLOOD BY AUTOMATED COUNT: 17.7 % (ref 11.9–14.6)
HCT VFR BLD AUTO: 51.3 % (ref 41.1–50.3)
HGB BLD-MCNC: 16.7 G/DL (ref 13.6–17.2)
IMM GRANULOCYTES # BLD AUTO: 0.1 K/UL (ref 0–0.5)
IMM GRANULOCYTES NFR BLD AUTO: 1 % (ref 0–5)
LYMPHOCYTES # BLD: 1.8 K/UL (ref 0.5–4.6)
LYMPHOCYTES NFR BLD: 27 % (ref 13–44)
MCH RBC QN AUTO: 26 PG (ref 26.1–32.9)
MCHC RBC AUTO-ENTMCNC: 32.6 G/DL (ref 31.4–35)
MCV RBC AUTO: 79.9 FL (ref 82–102)
MONOCYTES # BLD: 0.4 K/UL (ref 0.1–1.3)
MONOCYTES NFR BLD: 6 % (ref 4–12)
NEUTS SEG # BLD: 4.2 K/UL (ref 1.7–8.2)
NEUTS SEG NFR BLD: 61 % (ref 43–78)
NRBC # BLD: 0 K/UL (ref 0–0.2)
PLATELET # BLD AUTO: 602 K/UL (ref 150–450)
PMV BLD AUTO: 9 FL (ref 9.4–12.3)
RBC # BLD AUTO: 6.42 M/UL (ref 4.23–5.6)
WBC # BLD AUTO: 6.9 K/UL (ref 4.3–11.1)

## 2024-08-13 PROCEDURE — 99195 PHLEBOTOMY: CPT

## 2024-08-13 PROCEDURE — 36415 COLL VENOUS BLD VENIPUNCTURE: CPT

## 2024-08-13 PROCEDURE — 85025 COMPLETE CBC W/AUTO DIFF WBC: CPT

## 2024-08-13 RX ORDER — 0.9 % SODIUM CHLORIDE 0.9 %
250 INTRAVENOUS SOLUTION INTRAVENOUS ONCE
Status: CANCELLED | OUTPATIENT
Start: 2024-08-13 | End: 2024-08-13

## 2024-08-13 RX ORDER — 0.9 % SODIUM CHLORIDE 0.9 %
250 INTRAVENOUS SOLUTION INTRAVENOUS ONCE
OUTPATIENT
Start: 2024-08-13 | End: 2024-08-13

## 2024-08-13 RX ORDER — 0.9 % SODIUM CHLORIDE 0.9 %
250 INTRAVENOUS SOLUTION INTRAVENOUS ONCE
Status: DISCONTINUED | OUTPATIENT
Start: 2024-08-13 | End: 2024-08-14 | Stop reason: HOSPADM

## 2024-08-13 NOTE — PROGRESS NOTES
Arrived to the [facility]fusion Center ambulatory.  Phlebotomy completed.   Patient has had this procedure multiple times and has no questions.    Patient instructed to report any side affects to ordering provider.  Patient tolerated well. BP normal after phlebotomy, so no fluids given.  Any issues or concerns during appointment: none.  Patient aware of next infusion appointment on 9/20/2024 (date) at 0830 (time).  Discharged home ambulatory.

## 2024-09-20 ENCOUNTER — HOSPITAL ENCOUNTER (OUTPATIENT)
Dept: INFUSION THERAPY | Age: 54
Setting detail: INFUSION SERIES
Discharge: HOME | End: 2024-09-20

## 2024-09-20 ENCOUNTER — HOSPITAL ENCOUNTER (OUTPATIENT)
Dept: LAB | Age: 54
Discharge: HOME | End: 2024-09-23

## 2024-09-20 ENCOUNTER — TELEPHONE (OUTPATIENT)
Dept: FAMILY MEDICINE CLINIC | Facility: CLINIC | Age: 54
End: 2024-09-20

## 2024-09-20 VITALS
RESPIRATION RATE: 16 BRPM | SYSTOLIC BLOOD PRESSURE: 155 MMHG | TEMPERATURE: 97.7 F | OXYGEN SATURATION: 96 % | DIASTOLIC BLOOD PRESSURE: 79 MMHG | HEART RATE: 69 BPM

## 2024-09-20 DIAGNOSIS — Z12.5 SCREENING FOR PROSTATE CANCER: ICD-10-CM

## 2024-09-20 DIAGNOSIS — D45 POLYCYTHEMIA VERA (HCC): ICD-10-CM

## 2024-09-20 DIAGNOSIS — Z00.00 PHYSICAL EXAM, ANNUAL: ICD-10-CM

## 2024-09-20 LAB
ALBUMIN SERPL-MCNC: 4.2 G/DL (ref 3.5–5)
ALBUMIN/GLOB SERPL: 1.5 (ref 1–1.9)
ALP SERPL-CCNC: 80 U/L (ref 40–129)
ALT SERPL-CCNC: 38 U/L (ref 12–65)
ANION GAP SERPL CALC-SCNC: 13 MMOL/L (ref 9–18)
AST SERPL-CCNC: 34 U/L (ref 15–37)
BASOPHILS # BLD: 0.1 K/UL (ref 0–0.2)
BASOPHILS NFR BLD: 1 % (ref 0–2)
BILIRUB SERPL-MCNC: 0.6 MG/DL (ref 0–1.2)
BUN SERPL-MCNC: 15 MG/DL (ref 6–23)
CALCIUM SERPL-MCNC: 9.5 MG/DL (ref 8.8–10.2)
CHLORIDE SERPL-SCNC: 105 MMOL/L (ref 98–107)
CHOLEST SERPL-MCNC: 203 MG/DL (ref 0–200)
CO2 SERPL-SCNC: 24 MMOL/L (ref 20–28)
CREAT SERPL-MCNC: 0.79 MG/DL (ref 0.8–1.3)
DIFFERENTIAL METHOD BLD: ABNORMAL
EOSINOPHIL # BLD: 0.2 K/UL (ref 0–0.8)
EOSINOPHIL NFR BLD: 3 % (ref 0.5–7.8)
ERYTHROCYTE [DISTWIDTH] IN BLOOD BY AUTOMATED COUNT: 15.1 % (ref 11.9–14.6)
FERRITIN SERPL-MCNC: 17 NG/ML (ref 8–388)
GLOBULIN SER CALC-MCNC: 2.8 G/DL (ref 2.3–3.5)
GLUCOSE SERPL-MCNC: 102 MG/DL (ref 70–99)
HCT VFR BLD AUTO: 50.1 % (ref 41.1–50.3)
HDLC SERPL-MCNC: 52 MG/DL (ref 40–60)
HDLC SERPL: 3.9 (ref 0–5)
HGB BLD-MCNC: 16 G/DL (ref 13.6–17.2)
IMM GRANULOCYTES # BLD AUTO: 0.1 K/UL (ref 0–0.5)
IMM GRANULOCYTES NFR BLD AUTO: 1 % (ref 0–5)
LDLC SERPL CALC-MCNC: 117 MG/DL (ref 0–100)
LYMPHOCYTES # BLD: 1.9 K/UL (ref 0.5–4.6)
LYMPHOCYTES NFR BLD: 26 % (ref 13–44)
MCH RBC QN AUTO: 25.9 PG (ref 26.1–32.9)
MCHC RBC AUTO-ENTMCNC: 31.9 G/DL (ref 31.4–35)
MCV RBC AUTO: 81.2 FL (ref 82–102)
MONOCYTES # BLD: 0.5 K/UL (ref 0.1–1.3)
MONOCYTES NFR BLD: 7 % (ref 4–12)
NEUTS SEG # BLD: 4.5 K/UL (ref 1.7–8.2)
NEUTS SEG NFR BLD: 62 % (ref 43–78)
NRBC # BLD: 0 K/UL (ref 0–0.2)
PLATELET # BLD AUTO: 605 K/UL (ref 150–450)
PMV BLD AUTO: 9.3 FL (ref 9.4–12.3)
POTASSIUM SERPL-SCNC: 4.9 MMOL/L (ref 3.5–5.1)
PROT SERPL-MCNC: 6.9 G/DL (ref 6.3–8.2)
PSA SERPL-MCNC: 1.3 NG/ML (ref 0–4)
RBC # BLD AUTO: 6.17 M/UL (ref 4.23–5.6)
SODIUM SERPL-SCNC: 142 MMOL/L (ref 136–145)
TRIGL SERPL-MCNC: 174 MG/DL (ref 0–150)
TSH, 3RD GENERATION: 1.57 UIU/ML (ref 0.27–4.2)
VLDLC SERPL CALC-MCNC: 35 MG/DL (ref 6–23)
WBC # BLD AUTO: 7.2 K/UL (ref 4.3–11.1)

## 2024-09-20 PROCEDURE — 36415 COLL VENOUS BLD VENIPUNCTURE: CPT

## 2024-09-20 PROCEDURE — 82728 ASSAY OF FERRITIN: CPT

## 2024-09-20 PROCEDURE — 85025 COMPLETE CBC W/AUTO DIFF WBC: CPT

## 2024-09-20 PROCEDURE — 80061 LIPID PANEL: CPT

## 2024-09-20 PROCEDURE — G0103 PSA SCREENING: HCPCS

## 2024-09-20 PROCEDURE — 80053 COMPREHEN METABOLIC PANEL: CPT

## 2024-09-20 PROCEDURE — 84443 ASSAY THYROID STIM HORMONE: CPT

## 2024-09-20 NOTE — TELEPHONE ENCOUNTER
[facility] called they released the wrong labs and needs for Ms. Renee to re enter those labs.

## 2024-09-23 ENCOUNTER — HOSPITAL ENCOUNTER (OUTPATIENT)
Dept: INFUSION THERAPY | Age: 54
Setting detail: INFUSION SERIES
End: 2024-09-23

## 2024-09-23 NOTE — TELEPHONE ENCOUNTER
Patient was a no show for his appointment on the 9th with us for his physical.  Those were his physical labs.  Since they were resulted--was she going to get the lab to \"no charge\" the labs?  I can reorder the needed labs when he comes back to see us.  Please let Virginie know.  Smitha Farmer

## 2024-09-24 NOTE — TELEPHONE ENCOUNTER
Spoke with Virginie, she states they accidentally released the lab order, she states they will only run labs ordered from cancer center, but for any other provider they will not run or charge them.

## 2024-11-01 ENCOUNTER — APPOINTMENT (OUTPATIENT)
Dept: INFUSION THERAPY | Age: 54
End: 2024-11-01

## 2024-11-06 ENCOUNTER — HOSPITAL ENCOUNTER (OUTPATIENT)
Dept: INFUSION THERAPY | Age: 54
Setting detail: INFUSION SERIES
End: 2024-11-06

## 2024-12-13 ENCOUNTER — APPOINTMENT (OUTPATIENT)
Dept: INFUSION THERAPY | Age: 54
End: 2024-12-13

## 2024-12-16 RX ORDER — 0.9 % SODIUM CHLORIDE 0.9 %
250 INTRAVENOUS SOLUTION INTRAVENOUS ONCE
OUTPATIENT
Start: 2024-12-16 | End: 2024-12-16

## 2024-12-18 ENCOUNTER — HOSPITAL ENCOUNTER (OUTPATIENT)
Dept: INFUSION THERAPY | Age: 54
Setting detail: INFUSION SERIES
Discharge: HOME | End: 2024-12-18

## 2024-12-18 ENCOUNTER — HOSPITAL ENCOUNTER (OUTPATIENT)
Dept: LAB | Age: 54
Discharge: HOME | End: 2024-12-18

## 2024-12-18 VITALS
OXYGEN SATURATION: 97 % | DIASTOLIC BLOOD PRESSURE: 84 MMHG | TEMPERATURE: 97.3 F | HEART RATE: 66 BPM | RESPIRATION RATE: 16 BRPM | SYSTOLIC BLOOD PRESSURE: 138 MMHG

## 2024-12-18 DIAGNOSIS — D45 POLYCYTHEMIA VERA (HCC): Primary | ICD-10-CM

## 2024-12-18 DIAGNOSIS — D45 POLYCYTHEMIA VERA (HCC): ICD-10-CM

## 2024-12-18 LAB
ALBUMIN SERPL-MCNC: 3.8 G/DL (ref 3.5–5)
ALBUMIN/GLOB SERPL: 1.3 (ref 1–1.9)
ALP SERPL-CCNC: 73 U/L (ref 40–129)
ALT SERPL-CCNC: 27 U/L (ref 8–55)
ANION GAP SERPL CALC-SCNC: 10 MMOL/L (ref 7–16)
AST SERPL-CCNC: 30 U/L (ref 15–37)
BASOPHILS # BLD: 0.1 K/UL (ref 0–0.2)
BASOPHILS NFR BLD: 1 % (ref 0–2)
BILIRUB SERPL-MCNC: 0.7 MG/DL (ref 0–1.2)
BUN SERPL-MCNC: 18 MG/DL (ref 6–23)
CALCIUM SERPL-MCNC: 9 MG/DL (ref 8.8–10.2)
CHLORIDE SERPL-SCNC: 106 MMOL/L (ref 98–107)
CO2 SERPL-SCNC: 24 MMOL/L (ref 20–29)
CREAT SERPL-MCNC: 0.8 MG/DL (ref 0.8–1.3)
DIFFERENTIAL METHOD BLD: ABNORMAL
EOSINOPHIL # BLD: 0.4 K/UL (ref 0–0.8)
EOSINOPHIL NFR BLD: 5 % (ref 0.5–7.8)
ERYTHROCYTE [DISTWIDTH] IN BLOOD BY AUTOMATED COUNT: 17.3 % (ref 11.9–14.6)
FERRITIN SERPL-MCNC: 27 NG/ML (ref 8–388)
GLOBULIN SER CALC-MCNC: 3 G/DL (ref 2.3–3.5)
GLUCOSE SERPL-MCNC: 105 MG/DL (ref 70–99)
HCT VFR BLD AUTO: 50.3 % (ref 41.1–50.3)
HGB BLD-MCNC: 16.3 G/DL (ref 13.6–17.2)
IMM GRANULOCYTES # BLD AUTO: 0.1 K/UL (ref 0–0.5)
IMM GRANULOCYTES NFR BLD AUTO: 1 % (ref 0–5)
IRON SATN MFR SERPL: 19 % (ref 20–50)
IRON SERPL-MCNC: 74 UG/DL (ref 35–100)
LYMPHOCYTES # BLD: 1.7 K/UL (ref 0.5–4.6)
LYMPHOCYTES NFR BLD: 24 % (ref 13–44)
MCH RBC QN AUTO: 25.5 PG (ref 26.1–32.9)
MCHC RBC AUTO-ENTMCNC: 32.4 G/DL (ref 31.4–35)
MCV RBC AUTO: 78.7 FL (ref 82–102)
MONOCYTES # BLD: 0.5 K/UL (ref 0.1–1.3)
MONOCYTES NFR BLD: 7 % (ref 4–12)
NEUTS SEG # BLD: 4.4 K/UL (ref 1.7–8.2)
NEUTS SEG NFR BLD: 62 % (ref 43–78)
NRBC # BLD: 0 K/UL (ref 0–0.2)
PLATELET # BLD AUTO: 570 K/UL (ref 150–450)
PMV BLD AUTO: 9.3 FL (ref 9.4–12.3)
POTASSIUM SERPL-SCNC: 4.1 MMOL/L (ref 3.5–5.1)
PROT SERPL-MCNC: 6.8 G/DL (ref 6.3–8.2)
RBC # BLD AUTO: 6.39 M/UL (ref 4.23–5.6)
SODIUM SERPL-SCNC: 140 MMOL/L (ref 136–145)
TIBC SERPL-MCNC: 380 UG/DL (ref 240–450)
UIBC SERPL-MCNC: 306 UG/DL (ref 112–347)
WBC # BLD AUTO: 7.1 K/UL (ref 4.3–11.1)

## 2024-12-18 PROCEDURE — 82728 ASSAY OF FERRITIN: CPT

## 2024-12-18 PROCEDURE — 2580000003 HC RX 258: Performed by: NURSE PRACTITIONER

## 2024-12-18 PROCEDURE — 36415 COLL VENOUS BLD VENIPUNCTURE: CPT

## 2024-12-18 PROCEDURE — 83540 ASSAY OF IRON: CPT

## 2024-12-18 PROCEDURE — 83550 IRON BINDING TEST: CPT

## 2024-12-18 PROCEDURE — 99195 PHLEBOTOMY: CPT

## 2024-12-18 PROCEDURE — 80053 COMPREHEN METABOLIC PANEL: CPT

## 2024-12-18 PROCEDURE — 85025 COMPLETE CBC W/AUTO DIFF WBC: CPT

## 2024-12-18 PROCEDURE — 96360 HYDRATION IV INFUSION INIT: CPT

## 2024-12-18 RX ORDER — 0.9 % SODIUM CHLORIDE 0.9 %
250 INTRAVENOUS SOLUTION INTRAVENOUS ONCE
Status: COMPLETED | OUTPATIENT
Start: 2024-12-18 | End: 2024-12-18

## 2024-12-18 RX ADMIN — SODIUM CHLORIDE 250 ML: 9 INJECTION, SOLUTION INTRAVENOUS at 08:49

## 2024-12-18 NOTE — PROGRESS NOTES
Phlebotomy completed per order, pt tolerated well, 250ml NS infused after phlebotomy due to pt not eating breakfast prior to arriving, pt also given OJ, discharged home ambulatory in stable condition

## 2025-01-21 ENCOUNTER — OFFICE VISIT (OUTPATIENT)
Dept: FAMILY MEDICINE CLINIC | Facility: CLINIC | Age: 55
End: 2025-01-21
Payer: COMMERCIAL

## 2025-01-21 VITALS
HEIGHT: 67 IN | DIASTOLIC BLOOD PRESSURE: 80 MMHG | HEART RATE: 74 BPM | OXYGEN SATURATION: 96 % | SYSTOLIC BLOOD PRESSURE: 124 MMHG | WEIGHT: 193 LBS | RESPIRATION RATE: 18 BRPM | TEMPERATURE: 97 F | BODY MASS INDEX: 30.29 KG/M2

## 2025-01-21 DIAGNOSIS — E78.00 ELEVATED LDL CHOLESTEROL LEVEL: ICD-10-CM

## 2025-01-21 DIAGNOSIS — R73.03 PREDIABETES: Primary | ICD-10-CM

## 2025-01-21 DIAGNOSIS — E66.811 OBESITY (BMI 30.0-34.9): ICD-10-CM

## 2025-01-21 DIAGNOSIS — R73.03 PREDIABETES: ICD-10-CM

## 2025-01-21 DIAGNOSIS — D45 POLYCYTHEMIA VERA (HCC): ICD-10-CM

## 2025-01-21 LAB
EST. AVERAGE GLUCOSE BLD GHB EST-MCNC: 119 MG/DL
HBA1C MFR BLD: 5.8 % (ref 0–5.6)

## 2025-01-21 PROCEDURE — 99214 OFFICE O/P EST MOD 30 MIN: CPT | Performed by: FAMILY MEDICINE

## 2025-01-21 RX ORDER — SIMVASTATIN 20 MG
20 TABLET ORAL NIGHTLY
Qty: 30 TABLET | Refills: 0 | Status: SHIPPED | OUTPATIENT
Start: 2025-01-21 | End: 2025-01-21

## 2025-01-21 RX ORDER — SIMVASTATIN 20 MG
20 TABLET ORAL NIGHTLY
Qty: 90 TABLET | Refills: 0 | Status: SHIPPED | OUTPATIENT
Start: 2025-01-21 | End: 2025-04-21

## 2025-01-21 SDOH — ECONOMIC STABILITY: FOOD INSECURITY: WITHIN THE PAST 12 MONTHS, YOU WORRIED THAT YOUR FOOD WOULD RUN OUT BEFORE YOU GOT MONEY TO BUY MORE.: NEVER TRUE

## 2025-01-21 SDOH — ECONOMIC STABILITY: FOOD INSECURITY: WITHIN THE PAST 12 MONTHS, THE FOOD YOU BOUGHT JUST DIDN'T LAST AND YOU DIDN'T HAVE MONEY TO GET MORE.: NEVER TRUE

## 2025-01-21 ASSESSMENT — PATIENT HEALTH QUESTIONNAIRE - PHQ9
SUM OF ALL RESPONSES TO PHQ QUESTIONS 1-9: 0
SUM OF ALL RESPONSES TO PHQ QUESTIONS 1-9: 0
SUM OF ALL RESPONSES TO PHQ9 QUESTIONS 1 & 2: 0
1. LITTLE INTEREST OR PLEASURE IN DOING THINGS: NOT AT ALL
2. FEELING DOWN, DEPRESSED OR HOPELESS: NOT AT ALL
SUM OF ALL RESPONSES TO PHQ QUESTIONS 1-9: 0
SUM OF ALL RESPONSES TO PHQ QUESTIONS 1-9: 0

## 2025-01-21 NOTE — PROGRESS NOTES
Narciso Reyes (:  1970) is a 55 y.o. male,Established patient, here for evaluation of the following chief complaint(s):  Prediabetes (Follow up and labs)    Assessment & Plan  Prediabetes   Chronic, at goal (stable),  Dietary and lifestyle modifications recommended    Elevated LDL cholesterol level   Chronic, not at goal (unstable), changes made today: low dose statin    Orders:    simvastatin (ZOCOR) 20 MG tablet; Take 1 tablet by mouth nightly    Polycythemia vera (HCC)   Monitored by specialist- no acute findings meriting change in the plan    Obesity (BMI 30.0-34.9)   Chronic, not at goal (unstable), Calorie restriction and lifestyle modifications recommended      Return in about 3 months (around 2025).       Subjective   HPI  Mr. Reyes is a 55 y.o. male who presents today for follow up of chronic medical issues of prediabetes, elevated LDL cholesterol and also his polycythemia.      MPN Polycythemia Vera - Jak2 [id number] mut   Follows with oncology, He reported a diagnosis of hemochromatosis to his PCP for which he donates blood every 2 months.   He denied any clotting issues, bruising, or abdominal pain.  He continues to folow recommendations of his Oncologist does labs/ phlebotomy q6wks alternating w provider (NP/MD)   [goals Hct <46 and MCV ~80]     Pre-diabetes  Hemoglobin A1C   Date Value Ref Range Status   2023 5.4 4.8 - 5.6 % Final   Does not eat concentrated sweets or sodas (~ 1 /month). No polyuria, polydipsia or polyphagia.     Elevated LDL Cholesterol   Lab Results   Component Value Date    CHOL 203 (H) 2024    TRIG 174 (H) 2024    HDL 52 2024     (H) 2024    VLDL 35 (H) 2024    CHOLHDLRATIO 3.9 2024   Not on statins. Controls with diet. ASCVD 5.2-6.3%.     Obesity   Borderline with BMI of 30.23    Review of Systems   All other pertinent systems reviewed and negative except as noted in the HPI.    Objective   Physical Exam   General:

## 2025-01-29 ENCOUNTER — OFFICE VISIT (OUTPATIENT)
Dept: ONCOLOGY | Age: 55
End: 2025-01-29
Payer: COMMERCIAL

## 2025-01-29 ENCOUNTER — HOSPITAL ENCOUNTER (OUTPATIENT)
Dept: LAB | Age: 55
Discharge: HOME | End: 2025-01-29
Payer: COMMERCIAL

## 2025-01-29 ENCOUNTER — HOSPITAL ENCOUNTER (OUTPATIENT)
Dept: INFUSION THERAPY | Age: 55
Setting detail: INFUSION SERIES
Discharge: HOME | End: 2025-01-29

## 2025-01-29 VITALS
DIASTOLIC BLOOD PRESSURE: 86 MMHG | TEMPERATURE: 97.6 F | HEIGHT: 67 IN | WEIGHT: 193 LBS | BODY MASS INDEX: 30.29 KG/M2 | RESPIRATION RATE: 18 BRPM | HEART RATE: 68 BPM | OXYGEN SATURATION: 98 % | SYSTOLIC BLOOD PRESSURE: 140 MMHG

## 2025-01-29 VITALS
DIASTOLIC BLOOD PRESSURE: 89 MMHG | SYSTOLIC BLOOD PRESSURE: 129 MMHG | TEMPERATURE: 97.8 F | RESPIRATION RATE: 18 BRPM | HEART RATE: 69 BPM | OXYGEN SATURATION: 97 %

## 2025-01-29 DIAGNOSIS — D45 POLYCYTHEMIA VERA (HCC): Primary | ICD-10-CM

## 2025-01-29 DIAGNOSIS — E61.1 IRON DEFICIENCY: ICD-10-CM

## 2025-01-29 DIAGNOSIS — D45 POLYCYTHEMIA VERA (HCC): ICD-10-CM

## 2025-01-29 LAB
ALBUMIN SERPL-MCNC: 4.2 G/DL (ref 3.5–5)
ALBUMIN/GLOB SERPL: 1.4 (ref 1–1.9)
ALP SERPL-CCNC: 82 U/L (ref 40–129)
ALT SERPL-CCNC: 37 U/L (ref 8–55)
ANION GAP SERPL CALC-SCNC: 12 MMOL/L (ref 7–16)
AST SERPL-CCNC: 32 U/L (ref 15–37)
BASOPHILS # BLD: 0.1 K/UL (ref 0–0.2)
BASOPHILS NFR BLD: 1.2 % (ref 0–2)
BILIRUB SERPL-MCNC: 0.6 MG/DL (ref 0–1.2)
BUN SERPL-MCNC: 16 MG/DL (ref 6–23)
CALCIUM SERPL-MCNC: 9.6 MG/DL (ref 8.8–10.2)
CHLORIDE SERPL-SCNC: 102 MMOL/L (ref 98–107)
CO2 SERPL-SCNC: 23 MMOL/L (ref 20–29)
CREAT SERPL-MCNC: 0.85 MG/DL (ref 0.8–1.3)
DIFFERENTIAL METHOD BLD: ABNORMAL
EOSINOPHIL # BLD: 0.2 K/UL (ref 0–0.8)
EOSINOPHIL NFR BLD: 2.5 % (ref 0.5–7.8)
ERYTHROCYTE [DISTWIDTH] IN BLOOD BY AUTOMATED COUNT: 16.1 % (ref 11.9–14.6)
FERRITIN SERPL-MCNC: 20 NG/ML (ref 8–388)
GLOBULIN SER CALC-MCNC: 3.1 G/DL (ref 2.3–3.5)
GLUCOSE SERPL-MCNC: 96 MG/DL (ref 70–99)
HCT VFR BLD AUTO: 50.4 % (ref 41.1–50.3)
HGB BLD-MCNC: 16.1 G/DL (ref 13.6–17.2)
IMM GRANULOCYTES # BLD AUTO: 0.09 K/UL (ref 0–0.5)
IMM GRANULOCYTES NFR BLD AUTO: 1.1 % (ref 0–5)
IRON SATN MFR SERPL: 16 % (ref 20–50)
IRON SERPL-MCNC: 68 UG/DL (ref 35–100)
LYMPHOCYTES # BLD: 1.75 K/UL (ref 0.5–4.6)
LYMPHOCYTES NFR BLD: 21.8 % (ref 13–44)
MCH RBC QN AUTO: 25 PG (ref 26.1–32.9)
MCHC RBC AUTO-ENTMCNC: 31.9 G/DL (ref 31.4–35)
MCV RBC AUTO: 78.4 FL (ref 82–102)
MONOCYTES # BLD: 0.51 K/UL (ref 0.1–1.3)
MONOCYTES NFR BLD: 6.4 % (ref 4–12)
NEUTS SEG # BLD: 5.36 K/UL (ref 1.7–8.2)
NEUTS SEG NFR BLD: 67 % (ref 43–78)
NRBC # BLD: 0 K/UL (ref 0–0.2)
PLATELET # BLD AUTO: 630 K/UL (ref 150–450)
PMV BLD AUTO: 9.5 FL (ref 9.4–12.3)
POTASSIUM SERPL-SCNC: 4 MMOL/L (ref 3.5–5.1)
PROT SERPL-MCNC: 7.3 G/DL (ref 6.3–8.2)
RBC # BLD AUTO: 6.43 M/UL (ref 4.23–5.6)
SODIUM SERPL-SCNC: 137 MMOL/L (ref 136–145)
TIBC SERPL-MCNC: 435 UG/DL (ref 240–450)
UIBC SERPL-MCNC: 367 UG/DL (ref 112–347)
WBC # BLD AUTO: 8 K/UL (ref 4.3–11.1)

## 2025-01-29 PROCEDURE — 36415 COLL VENOUS BLD VENIPUNCTURE: CPT

## 2025-01-29 PROCEDURE — 83540 ASSAY OF IRON: CPT

## 2025-01-29 PROCEDURE — 80053 COMPREHEN METABOLIC PANEL: CPT

## 2025-01-29 PROCEDURE — 85025 COMPLETE CBC W/AUTO DIFF WBC: CPT

## 2025-01-29 PROCEDURE — 99214 OFFICE O/P EST MOD 30 MIN: CPT | Performed by: NURSE PRACTITIONER

## 2025-01-29 PROCEDURE — 99195 PHLEBOTOMY: CPT

## 2025-01-29 PROCEDURE — 83550 IRON BINDING TEST: CPT

## 2025-01-29 PROCEDURE — 82728 ASSAY OF FERRITIN: CPT

## 2025-01-29 RX ORDER — 0.9 % SODIUM CHLORIDE 0.9 %
250 INTRAVENOUS SOLUTION INTRAVENOUS ONCE
Status: DISCONTINUED | OUTPATIENT
Start: 2025-01-29 | End: 2025-01-30 | Stop reason: HOSPADM

## 2025-01-29 RX ORDER — 0.9 % SODIUM CHLORIDE 0.9 %
250 INTRAVENOUS SOLUTION INTRAVENOUS ONCE
OUTPATIENT
Start: 2025-01-29 | End: 2025-01-29

## 2025-01-29 ASSESSMENT — ENCOUNTER SYMPTOMS
HEMOPTYSIS: 0
DIARRHEA: 0
BLOOD IN STOOL: 0
SORE THROAT: 0
CONSTIPATION: 0
ABDOMINAL PAIN: 0
BACK PAIN: 0
SCLERAL ICTERUS: 0
COUGH: 0
NAUSEA: 0
SHORTNESS OF BREATH: 0
ABDOMINAL DISTENTION: 0
VOMITING: 0
EYE PROBLEMS: 0

## 2025-01-29 NOTE — PROGRESS NOTES
Arrived to the Infusion Center.  Phlebotomy completed. Patient tolerated well. Pt declined fluids after procedure  Any issues or concerns during appointment: none.  Patient aware of next infusion appointment on 3/12/25 (date) at 3:30 (time).  Patient instructed to call provider with temperature of 100.4 or greater or nausea/vomiting/ diarrhea or pain not controlled by medications  Discharged ambulatory with family member.

## 2025-01-29 NOTE — PROGRESS NOTES
phlebotomies.  He is excluded from blood donation bc of PV.  Will need to phleb here at [facility].  He will get coated aspirin going forward.  His blood pressure is controlled at this time.  No smoking advised, and optimization of weight reviewed.  He will follow-up with his primary care physician regarding cholesterol and lipid management.  He will follow-up with me in 4 months with labs prior.  -nHe had phlebotomy about a couple weeks ago for HCT 51.  He will come back in 2 to 3 weeks for labs alone to determine if he needs further phlebotomy then a month after and then follow-up with me a month after that.  He is in agreement with this plan.  His daughter is in [place] right now studying architecture in [employer].    All questions were asked and answered to the best of my ability.  The patient verbalized understanding and agrees with the plan above.        Dana Pompa) [facility] Hematology and Oncology  [address] [place] [address]  Office : [phone]  Fax : [phone]

## 2025-02-10 DIAGNOSIS — E78.00 ELEVATED LDL CHOLESTEROL LEVEL: ICD-10-CM

## 2025-02-10 RX ORDER — SIMVASTATIN 20 MG
20 TABLET ORAL NIGHTLY
Qty: 30 TABLET | OUTPATIENT
Start: 2025-02-10

## 2025-03-11 ENCOUNTER — APPOINTMENT (OUTPATIENT)
Dept: CT IMAGING | Age: 55
End: 2025-03-11
Payer: COMMERCIAL

## 2025-03-11 ENCOUNTER — HOSPITAL ENCOUNTER (EMERGENCY)
Age: 55
Discharge: HOME | End: 2025-03-11
Attending: GENERAL PRACTICE
Payer: COMMERCIAL

## 2025-03-11 VITALS
DIASTOLIC BLOOD PRESSURE: 98 MMHG | OXYGEN SATURATION: 98 % | BODY MASS INDEX: 30.23 KG/M2 | RESPIRATION RATE: 16 BRPM | SYSTOLIC BLOOD PRESSURE: 136 MMHG | TEMPERATURE: 98.4 F | HEART RATE: 86 BPM | HEIGHT: 67 IN | WEIGHT: 192.6 LBS

## 2025-03-11 DIAGNOSIS — S16.1XXA ACUTE STRAIN OF NECK MUSCLE, INITIAL ENCOUNTER: ICD-10-CM

## 2025-03-11 DIAGNOSIS — S09.90XA INJURY OF HEAD, INITIAL ENCOUNTER: Primary | ICD-10-CM

## 2025-03-11 PROCEDURE — 70450 CT HEAD/BRAIN W/O DYE: CPT

## 2025-03-11 PROCEDURE — 72125 CT NECK SPINE W/O DYE: CPT

## 2025-03-11 PROCEDURE — 99284 EMERGENCY DEPT VISIT MOD MDM: CPT

## 2025-03-11 ASSESSMENT — PAIN DESCRIPTION - LOCATION: LOCATION: NECK

## 2025-03-11 ASSESSMENT — PAIN - FUNCTIONAL ASSESSMENT: PAIN_FUNCTIONAL_ASSESSMENT: 0-10

## 2025-03-11 ASSESSMENT — ENCOUNTER SYMPTOMS
SHORTNESS OF BREATH: 0
CHEST TIGHTNESS: 0

## 2025-03-11 ASSESSMENT — LIFESTYLE VARIABLES
HOW OFTEN DO YOU HAVE A DRINK CONTAINING ALCOHOL: MONTHLY OR LESS
HOW MANY STANDARD DRINKS CONTAINING ALCOHOL DO YOU HAVE ON A TYPICAL DAY: 1 OR 2

## 2025-03-11 ASSESSMENT — PAIN SCALES - GENERAL
PAINLEVEL_OUTOF10: 3
PAINLEVEL_OUTOF10: 3

## 2025-03-12 NOTE — ED TRIAGE NOTES
C/o fall on ice this am from ground level. He did hit the back of his head, does not remember exactly what happened. Does not take blood thinners. Reports headache today and has taken tylenol twice but pain still continues. Reports some neck pain as well. Denies blurry vision or slurred speech. Family did help with history as patient does not remember all of the incident. Last tylenol around 5p.

## 2025-03-12 NOTE — ED PROVIDER NOTES
Emergency Department Provider Note       PCP: Smitha Farmer, RIOS - CNP   Age: 55 y.o.   Sex: male     DISPOSITION Decision To Discharge 03/11/2025 10:20:05 PM   DISPOSITION CONDITION Stable            ICD-10-CM    1. Injury of head, initial encounter  S09.90XA       2. Acute strain of neck muscle, initial encounter  S16.1XXA           Medical Decision Making   Patient is a 56 y/o male who presents to the ED after a fall from ground-level this morning. Patient does not have any symptoms concerning for elevated ICP. No midline tenderness on cervical exam. Suspect muscle strain in paracervical muscles secondary to patient's physical exam findings. He is neurologically intact and at baseline. Will obtain a non-contrast head CT and cervical spine CT.     CT brain and C-spine showed no evidence of skull fracture or intracranial hemorrhage or cervical spine fracture.  Patient is well-appearing.  Apparently has amnesia of the event.  Still has headache.  Could have mild concussion.  Patient will treat symptomatically expectantly.  Patient is to follow-up with primary care and return precautions given.     1 acute complicated illness or injury.  1 acute, uncomplicated illness or injury.  Shared medical decision making was utilized in creating the patients health plan today.    I independently ordered and reviewed each unique test.       I interpreted the CT Scan CT scan of the brain and C-spine shows no evidence of skull fracture or intracranial hemorrhage or cervical spine fracture or subluxation.  I have reviewed and agree with radiology report.              History     Patient is a 56 y/o male with PMHx of HLD who presents to the ED after a fall from ground-level. Patient was at work when he slipped on a patch of ice. He fell backward and hit the back of his head. Denies loss of consciousness. No nausea or vomiting after the event. Family is at bedside. Daughter states that her father was repeating questions

## 2025-03-14 ENCOUNTER — HOSPITAL ENCOUNTER (OUTPATIENT)
Dept: INFUSION THERAPY | Age: 55
Setting detail: INFUSION SERIES
Discharge: HOME | End: 2025-03-14
Payer: COMMERCIAL

## 2025-03-14 ENCOUNTER — HOSPITAL ENCOUNTER (OUTPATIENT)
Dept: LAB | Age: 55
Discharge: HOME | End: 2025-03-14
Payer: COMMERCIAL

## 2025-03-14 VITALS
OXYGEN SATURATION: 97 % | RESPIRATION RATE: 16 BRPM | TEMPERATURE: 98 F | SYSTOLIC BLOOD PRESSURE: 125 MMHG | DIASTOLIC BLOOD PRESSURE: 83 MMHG | HEART RATE: 70 BPM

## 2025-03-14 DIAGNOSIS — D45 POLYCYTHEMIA VERA (HCC): ICD-10-CM

## 2025-03-14 DIAGNOSIS — D45 POLYCYTHEMIA VERA: Primary | ICD-10-CM

## 2025-03-14 LAB
BASOPHILS # BLD: 0.09 K/UL (ref 0–0.2)
BASOPHILS NFR BLD: 1.3 % (ref 0–2)
DIFFERENTIAL METHOD BLD: ABNORMAL
EOSINOPHIL # BLD: 0.25 K/UL (ref 0–0.8)
EOSINOPHIL NFR BLD: 3.7 % (ref 0.5–7.8)
ERYTHROCYTE [DISTWIDTH] IN BLOOD BY AUTOMATED COUNT: 14.8 % (ref 11.9–14.6)
HCT VFR BLD AUTO: 46.2 % (ref 41.1–50.3)
HGB BLD-MCNC: 14.7 G/DL (ref 13.6–17.2)
IMM GRANULOCYTES # BLD AUTO: 0.04 K/UL (ref 0–0.5)
IMM GRANULOCYTES NFR BLD AUTO: 0.6 % (ref 0–5)
LYMPHOCYTES # BLD: 1.83 K/UL (ref 0.5–4.6)
LYMPHOCYTES NFR BLD: 27.4 % (ref 13–44)
MCH RBC QN AUTO: 23.7 PG (ref 26.1–32.9)
MCHC RBC AUTO-ENTMCNC: 31.8 G/DL (ref 31.4–35)
MCV RBC AUTO: 74.6 FL (ref 82–102)
MONOCYTES # BLD: 0.4 K/UL (ref 0.1–1.3)
MONOCYTES NFR BLD: 6 % (ref 4–12)
NEUTS SEG # BLD: 4.08 K/UL (ref 1.7–8.2)
NEUTS SEG NFR BLD: 61 % (ref 43–78)
NRBC # BLD: 0 K/UL (ref 0–0.2)
PLATELET # BLD AUTO: 537 K/UL (ref 150–450)
PMV BLD AUTO: 9.3 FL (ref 9.4–12.3)
RBC # BLD AUTO: 6.19 M/UL (ref 4.23–5.6)
WBC # BLD AUTO: 6.7 K/UL (ref 4.3–11.1)

## 2025-03-14 PROCEDURE — 85025 COMPLETE CBC W/AUTO DIFF WBC: CPT

## 2025-03-14 PROCEDURE — 2500000003 HC RX 250 WO HCPCS: Performed by: NURSE PRACTITIONER

## 2025-03-14 PROCEDURE — 36415 COLL VENOUS BLD VENIPUNCTURE: CPT

## 2025-03-14 PROCEDURE — 2580000003 HC RX 258: Performed by: NURSE PRACTITIONER

## 2025-03-14 PROCEDURE — 99195 PHLEBOTOMY: CPT

## 2025-03-14 RX ORDER — 0.9 % SODIUM CHLORIDE 0.9 %
250 INTRAVENOUS SOLUTION INTRAVENOUS ONCE
Status: COMPLETED | OUTPATIENT
Start: 2025-03-14 | End: 2025-03-14

## 2025-03-14 RX ORDER — 0.9 % SODIUM CHLORIDE 0.9 %
250 INTRAVENOUS SOLUTION INTRAVENOUS ONCE
OUTPATIENT
Start: 2025-03-14 | End: 2025-03-14

## 2025-03-14 RX ORDER — SODIUM CHLORIDE 0.9 % (FLUSH) 0.9 %
5-40 SYRINGE (ML) INJECTION PRN
Status: DISCONTINUED | OUTPATIENT
Start: 2025-03-14 | End: 2025-03-15 | Stop reason: HOSPADM

## 2025-03-14 RX ADMIN — SODIUM CHLORIDE, PRESERVATIVE FREE 10 ML: 5 INJECTION INTRAVENOUS at 08:45

## 2025-03-14 RX ADMIN — SODIUM CHLORIDE 250 ML: 9 INJECTION, SOLUTION INTRAVENOUS at 08:50

## 2025-03-14 NOTE — PROGRESS NOTES
Arrived to the Infusion Center. 500 ml phlebotomy completed. Patient tolerated well.   Any issues or concerns during appointment: none.  Patient aware of next infusion appointment on 4/25/25 (date) at 0900 (time).  Patient aware of next lab and [facility] office visit on 4/25/25 (date) at 0810 (time).  Patient instructed to call provider with temperature of 100.4 or greater or nausea/vomiting/ diarrhea or pain not controlled by medications  Discharged ambulatory accompanied with daughter.

## 2025-04-15 ENCOUNTER — OFFICE VISIT (OUTPATIENT)
Dept: FAMILY MEDICINE CLINIC | Facility: CLINIC | Age: 55
End: 2025-04-15
Payer: COMMERCIAL

## 2025-04-15 VITALS
SYSTOLIC BLOOD PRESSURE: 128 MMHG | RESPIRATION RATE: 18 BRPM | OXYGEN SATURATION: 98 % | DIASTOLIC BLOOD PRESSURE: 86 MMHG | HEIGHT: 67 IN | WEIGHT: 195 LBS | TEMPERATURE: 98 F | BODY MASS INDEX: 30.61 KG/M2 | HEART RATE: 69 BPM

## 2025-04-15 DIAGNOSIS — E66.811 OBESITY (BMI 30.0-34.9): ICD-10-CM

## 2025-04-15 DIAGNOSIS — D47.1 MYELOPROLIFERATIVE DISORDER (HCC): ICD-10-CM

## 2025-04-15 DIAGNOSIS — D45 POLYCYTHEMIA VERA: ICD-10-CM

## 2025-04-15 DIAGNOSIS — E78.00 ELEVATED LDL CHOLESTEROL LEVEL: ICD-10-CM

## 2025-04-15 DIAGNOSIS — R73.03 PREDIABETES: Primary | ICD-10-CM

## 2025-04-15 PROCEDURE — 99214 OFFICE O/P EST MOD 30 MIN: CPT | Performed by: FAMILY MEDICINE

## 2025-04-15 RX ORDER — MULTIVITAMIN WITH IRON
1 TABLET ORAL DAILY
COMMUNITY

## 2025-04-15 SDOH — ECONOMIC STABILITY: FOOD INSECURITY: WITHIN THE PAST 12 MONTHS, YOU WORRIED THAT YOUR FOOD WOULD RUN OUT BEFORE YOU GOT MONEY TO BUY MORE.: NEVER TRUE

## 2025-04-15 SDOH — ECONOMIC STABILITY: FOOD INSECURITY: WITHIN THE PAST 12 MONTHS, THE FOOD YOU BOUGHT JUST DIDN'T LAST AND YOU DIDN'T HAVE MONEY TO GET MORE.: NEVER TRUE

## 2025-04-15 ASSESSMENT — PATIENT HEALTH QUESTIONNAIRE - PHQ9
SUM OF ALL RESPONSES TO PHQ QUESTIONS 1-9: 0
SUM OF ALL RESPONSES TO PHQ QUESTIONS 1-9: 0
2. FEELING DOWN, DEPRESSED OR HOPELESS: NOT AT ALL
1. LITTLE INTEREST OR PLEASURE IN DOING THINGS: NOT AT ALL
SUM OF ALL RESPONSES TO PHQ QUESTIONS 1-9: 0
SUM OF ALL RESPONSES TO PHQ QUESTIONS 1-9: 0

## 2025-04-15 NOTE — ASSESSMENT & PLAN NOTE
Monitored by specialist- no acute findings meriting change in the plan  Goal Hematocrit < 45; phlebotomy every 6weeks  He remains off oral iron supplement.  There are no GI or bowel complaints.  Appetite and energy level remain normal.  He denies any recent illnesses, respiratory symptoms, or edema.   Recent Phlebotomy on 03/14/2025; next appointment 04/25

## 2025-04-15 NOTE — PROGRESS NOTES
Narciso Reyes (:  1970) is a 55 y.o. male,Established patient, here for evaluation of the following chief complaint(s):  Follow-up (Wants blood work, A1c, tryglicerides, cholesterol and all that)    Assessment & Plan  Prediabetes   Chronic, at goal (stable), Diet and lifestyle modifications recommended  This SmartLink has not been configured with any valid records.     Hemoglobin A1C   Date Value Ref Range Status   2025 5.8 (H) 0 - 5.6 % Final     Comment:     Reference Range  Normal       <5.7%  Prediabetes  5.7-6.4%  Diabetes     >6.4%       Elevated LDL cholesterol level   Chronic, at goal (stable), continue current treatment plan Continue simvastatin 20 mg nightly  The 10-year ASCVD risk score (Dajuan DANIELS, et al., 2019) is: 6.6%    Values used to calculate the score:      Age: 55 years      Sex: Male      Is Non- : No      Diabetic: No      Tobacco smoker: No      Systolic Blood Pressure: 128 mmHg      Is BP treated: No      HDL Cholesterol: 52 MG/DL      Total Cholesterol: 203 MG/DL    Obesity (BMI 30.0-34.9)   Chronic, not at goal (unstable), Calorie restriction and lifestyle modifications recommended    Myeloproliferative disorder (HCC)   Monitored by specialist- no acute findings meriting change in the plan    Polycythemia vera   Monitored by specialist- no acute findings meriting change in the plan  Goal Hematocrit < 45; phlebotomy every 6weeks  He remains off oral iron supplement.  There are no GI or bowel complaints.  Appetite and energy level remain normal.  He denies any recent illnesses, respiratory symptoms, or edema.   Recent Phlebotomy on 2025; next appointment       Return in about 3 months (around 7/15/2025) for Physical.       Subjective   HPI  Mr. Reyes is a 55 y.o. male who presents today for follow up of chronic medical issues of prediabetes, elevated LDL cholesterol and also his polycythemia.       MPN /Polycythemia Vera - Jak2 [id number] mut

## 2025-04-18 DIAGNOSIS — E78.00 ELEVATED LDL CHOLESTEROL LEVEL: ICD-10-CM

## 2025-04-18 RX ORDER — SIMVASTATIN 20 MG
20 TABLET ORAL NIGHTLY
Qty: 90 TABLET | Refills: 0 | Status: SHIPPED | OUTPATIENT
Start: 2025-04-18

## 2025-04-24 RX ORDER — 0.9 % SODIUM CHLORIDE 0.9 %
250 INTRAVENOUS SOLUTION INTRAVENOUS ONCE
OUTPATIENT
Start: 2025-04-24 | End: 2025-04-24

## 2025-04-25 ENCOUNTER — HOSPITAL ENCOUNTER (OUTPATIENT)
Dept: LAB | Age: 55
Discharge: HOME | End: 2025-04-25
Payer: COMMERCIAL

## 2025-04-25 ENCOUNTER — HOSPITAL ENCOUNTER (OUTPATIENT)
Dept: INFUSION THERAPY | Age: 55
Setting detail: INFUSION SERIES
Discharge: HOME | End: 2025-04-25

## 2025-04-25 VITALS
TEMPERATURE: 97.5 F | HEART RATE: 70 BPM | RESPIRATION RATE: 16 BRPM | OXYGEN SATURATION: 97 % | DIASTOLIC BLOOD PRESSURE: 87 MMHG | SYSTOLIC BLOOD PRESSURE: 137 MMHG

## 2025-04-25 DIAGNOSIS — D45 POLYCYTHEMIA VERA (HCC): ICD-10-CM

## 2025-04-25 LAB
BASOPHILS # BLD: 0.09 K/UL (ref 0–0.2)
BASOPHILS NFR BLD: 1.1 % (ref 0–2)
DIFFERENTIAL METHOD BLD: ABNORMAL
EOSINOPHIL # BLD: 0.31 K/UL (ref 0–0.8)
EOSINOPHIL NFR BLD: 3.9 % (ref 0.5–7.8)
ERYTHROCYTE [DISTWIDTH] IN BLOOD BY AUTOMATED COUNT: 16.8 % (ref 11.9–14.6)
HCT VFR BLD AUTO: 44.6 % (ref 41.1–50.3)
HGB BLD-MCNC: 13.7 G/DL (ref 13.6–17.2)
IMM GRANULOCYTES # BLD AUTO: 0.09 K/UL (ref 0–0.5)
IMM GRANULOCYTES NFR BLD AUTO: 1.1 % (ref 0–5)
LYMPHOCYTES # BLD: 1.76 K/UL (ref 0.5–4.6)
LYMPHOCYTES NFR BLD: 22.4 % (ref 13–44)
MCH RBC QN AUTO: 21.9 PG (ref 26.1–32.9)
MCHC RBC AUTO-ENTMCNC: 30.7 G/DL (ref 31.4–35)
MCV RBC AUTO: 71.2 FL (ref 82–102)
MONOCYTES # BLD: 0.47 K/UL (ref 0.1–1.3)
MONOCYTES NFR BLD: 6 % (ref 4–12)
NEUTS SEG # BLD: 5.14 K/UL (ref 1.7–8.2)
NEUTS SEG NFR BLD: 65.5 % (ref 43–78)
NRBC # BLD: 0 K/UL (ref 0–0.2)
PLATELET # BLD AUTO: 534 K/UL (ref 150–450)
PMV BLD AUTO: 9.1 FL (ref 9.4–12.3)
RBC # BLD AUTO: 6.26 M/UL (ref 4.23–5.6)
WBC # BLD AUTO: 7.9 K/UL (ref 4.3–11.1)

## 2025-04-25 PROCEDURE — 36415 COLL VENOUS BLD VENIPUNCTURE: CPT

## 2025-04-25 PROCEDURE — 85025 COMPLETE CBC W/AUTO DIFF WBC: CPT

## 2025-06-10 NOTE — PROGRESS NOTES
[facility] Hematology and Oncology: Established patient - follow up     Chief Complaint   Patient presents with    Follow-up     Diagnosis - polycythemia vera   Reason for Referral: Elevated red blood cell count  Referring Provider:  RIOS Titus CNP  Family History of Cancer/Hematologic Disorders: Family history is significant for patient's brother with polycythemia.   Presenting Symptoms: No relevant physical symptoms reported.     History of Present Illness:  Mr. Reyes is a 55 y.o. male who presents today for follow up regarding polycythemia.  The past medical history is significant for rheumatoid arthritis, hyperkalemia, WILI, depression, elevated blood sugar, elevated cholesterol, and lipoma removal.  He reported a diagnosis of hemochromatosis to PCP for which he donates blood every 2 months.  He was most recently seen by his PCP for routine follow-up on 10/27/22.  Review of systems and physical exam were unremarkable.  Labs from same day: RBC 6.20, HGB 11.9, MCV 68.5, MCH 19.2, MCHC 28.0, RDW 19.0, and .  Patient reported to PCP that his brother also has polycythemia.  He denied any clotting issues, bruising, or abdominal pain.  He continues to donate blood at [facility] every 2 months.  After review of labs, PCP noted, “Referred to hematologist. Would like to just make sure that there is no other treatment needed for his polycythemia.”  - At consultation, he was here with his daughter.  Pt was not sure of his diagnosis.  He did not know if he has PV or hemochromatosis.  Daughter is here with him, and she does not know etiology of his original polycythemia.  Pt has been donating blood leading to microcytic anemia.  Will need to complete further workup before final recs are made.  I recommend holding off on phlebotomy at this time, he reported episodes of dizziness and fatigue.  He wishes to p/w further workup at this time.    No HFE.    - here for FU.  Labs c/w MPN.  [employer]

## 2025-06-13 ENCOUNTER — HOSPITAL ENCOUNTER (OUTPATIENT)
Dept: INFUSION THERAPY | Age: 55
Setting detail: INFUSION SERIES
End: 2025-06-13

## 2025-06-13 ENCOUNTER — HOSPITAL ENCOUNTER (OUTPATIENT)
Dept: LAB | Age: 55
Discharge: HOME | End: 2025-06-13
Payer: COMMERCIAL

## 2025-06-13 ENCOUNTER — OFFICE VISIT (OUTPATIENT)
Dept: ONCOLOGY | Age: 55
End: 2025-06-13
Payer: COMMERCIAL

## 2025-06-13 VITALS
WEIGHT: 197 LBS | OXYGEN SATURATION: 98 % | TEMPERATURE: 97.7 F | HEIGHT: 67 IN | BODY MASS INDEX: 30.92 KG/M2 | RESPIRATION RATE: 16 BRPM | SYSTOLIC BLOOD PRESSURE: 125 MMHG | DIASTOLIC BLOOD PRESSURE: 85 MMHG | HEART RATE: 68 BPM

## 2025-06-13 DIAGNOSIS — Z15.89 JAK2 V617F MUTATION: ICD-10-CM

## 2025-06-13 DIAGNOSIS — E61.1 IRON DEFICIENCY: ICD-10-CM

## 2025-06-13 DIAGNOSIS — D45 POLYCYTHEMIA VERA (HCC): Primary | ICD-10-CM

## 2025-06-13 DIAGNOSIS — D45 POLYCYTHEMIA VERA (HCC): ICD-10-CM

## 2025-06-13 LAB
ALBUMIN SERPL-MCNC: 3.9 G/DL (ref 3.5–5)
ALBUMIN/GLOB SERPL: 1.3 (ref 1–1.9)
ALP SERPL-CCNC: 83 U/L (ref 40–129)
ALT SERPL-CCNC: 26 U/L (ref 8–55)
ANION GAP SERPL CALC-SCNC: 11 MMOL/L (ref 7–16)
AST SERPL-CCNC: 29 U/L (ref 15–37)
BASOPHILS # BLD: 0.09 K/UL (ref 0–0.2)
BASOPHILS NFR BLD: 1.2 % (ref 0–2)
BILIRUB SERPL-MCNC: 0.6 MG/DL (ref 0–1.2)
BUN SERPL-MCNC: 15 MG/DL (ref 6–23)
CALCIUM SERPL-MCNC: 9.3 MG/DL (ref 8.8–10.2)
CHLORIDE SERPL-SCNC: 105 MMOL/L (ref 98–107)
CO2 SERPL-SCNC: 22 MMOL/L (ref 20–29)
CREAT SERPL-MCNC: 0.97 MG/DL (ref 0.8–1.3)
DIFFERENTIAL METHOD BLD: ABNORMAL
EOSINOPHIL # BLD: 0.27 K/UL (ref 0–0.8)
EOSINOPHIL NFR BLD: 3.7 % (ref 0.5–7.8)
ERYTHROCYTE [DISTWIDTH] IN BLOOD BY AUTOMATED COUNT: 19.9 % (ref 11.9–14.6)
FERRITIN SERPL-MCNC: 13 NG/ML (ref 8–388)
GLOBULIN SER CALC-MCNC: 3.1 G/DL (ref 2.3–3.5)
GLUCOSE SERPL-MCNC: 125 MG/DL (ref 70–99)
HCT VFR BLD AUTO: 44.3 % (ref 41.1–50.3)
HGB BLD-MCNC: 13.6 G/DL (ref 13.6–17.2)
IMM GRANULOCYTES # BLD AUTO: 0.06 K/UL (ref 0–0.5)
IMM GRANULOCYTES NFR BLD AUTO: 0.8 % (ref 0–5)
IRON SATN MFR SERPL: 10 % (ref 20–50)
IRON SERPL-MCNC: 43 UG/DL (ref 35–100)
LYMPHOCYTES # BLD: 1.89 K/UL (ref 0.5–4.6)
LYMPHOCYTES NFR BLD: 25.6 % (ref 13–44)
MCH RBC QN AUTO: 21.1 PG (ref 26.1–32.9)
MCHC RBC AUTO-ENTMCNC: 30.7 G/DL (ref 31.4–35)
MCV RBC AUTO: 68.7 FL (ref 82–102)
MONOCYTES # BLD: 0.44 K/UL (ref 0.1–1.3)
MONOCYTES NFR BLD: 6 % (ref 4–12)
NEUTS SEG # BLD: 4.62 K/UL (ref 1.7–8.2)
NEUTS SEG NFR BLD: 62.7 % (ref 43–78)
NRBC # BLD: 0 K/UL (ref 0–0.2)
PLATELET # BLD AUTO: 588 K/UL (ref 150–450)
PMV BLD AUTO: 9.3 FL (ref 9.4–12.3)
POTASSIUM SERPL-SCNC: 3.8 MMOL/L (ref 3.5–5.1)
PROT SERPL-MCNC: 6.9 G/DL (ref 6.3–8.2)
RBC # BLD AUTO: 6.45 M/UL (ref 4.23–5.6)
SODIUM SERPL-SCNC: 138 MMOL/L (ref 136–145)
TIBC SERPL-MCNC: 414 UG/DL (ref 240–450)
UIBC SERPL-MCNC: 371 UG/DL (ref 112–347)
WBC # BLD AUTO: 7.4 K/UL (ref 4.3–11.1)

## 2025-06-13 PROCEDURE — 99213 OFFICE O/P EST LOW 20 MIN: CPT | Performed by: INTERNAL MEDICINE

## 2025-06-13 PROCEDURE — 36415 COLL VENOUS BLD VENIPUNCTURE: CPT

## 2025-06-13 PROCEDURE — 85025 COMPLETE CBC W/AUTO DIFF WBC: CPT

## 2025-06-13 PROCEDURE — 83550 IRON BINDING TEST: CPT

## 2025-06-13 PROCEDURE — 80053 COMPREHEN METABOLIC PANEL: CPT

## 2025-06-13 PROCEDURE — 82728 ASSAY OF FERRITIN: CPT

## 2025-06-13 PROCEDURE — 83540 ASSAY OF IRON: CPT

## 2025-06-13 ASSESSMENT — PATIENT HEALTH QUESTIONNAIRE - PHQ9
2. FEELING DOWN, DEPRESSED OR HOPELESS: NOT AT ALL
SUM OF ALL RESPONSES TO PHQ QUESTIONS 1-9: 0
SUM OF ALL RESPONSES TO PHQ QUESTIONS 1-9: 0
1. LITTLE INTEREST OR PLEASURE IN DOING THINGS: NOT AT ALL
SUM OF ALL RESPONSES TO PHQ QUESTIONS 1-9: 0
SUM OF ALL RESPONSES TO PHQ QUESTIONS 1-9: 0

## 2025-06-13 NOTE — PATIENT INSTRUCTIONS
Gap 06/13/2025 11  7 - 16 mmol/L Final    Glucose 06/13/2025 125 (H)  70 - 99 mg/dL Final    Comment: <70 mg/dL Consistent with, but not fully diagnostic of hypoglycemia.  100 - 125 mg/dL Impaired fasting glucose/consistent with pre-diabetes mellitus.  > 126 mg/dl Fasting glucose consistent with overt diabetes mellitus      BUN 06/13/2025 15  6 - 23 MG/DL Final    Creatinine 06/13/2025 0.97  0.80 - 1.30 MG/DL Final    Est, Glom Filt Rate 06/13/2025 >90  >60 ml/min/1.73m2 Final    Comment:    Pediatric calculator link: https://www.kidney.org/professionals/kdoqi/gfr_calculatorped     These results are not intended for use in patients <18 years of age.     eGFR results are calculated without a race factor using  the 2021 CKD-EPI equation. Careful clinical correlation is recommended, particularly when comparing to results calculated using previous equations.  The CKD-EPI equation is less accurate in patients with extremes of muscle mass, extra-renal metabolism of creatinine, excessive creatine ingestion, or following therapy that affects renal tubular secretion.      Calcium 06/13/2025 9.3  8.8 - 10.2 MG/DL Final    Total Bilirubin 06/13/2025 0.6  0.0 - 1.2 MG/DL Final    ALT 06/13/2025 26  8 - 55 U/L Final    AST 06/13/2025 29  15 - 37 U/L Final    Alk Phosphatase 06/13/2025 83  40 - 129 U/L Final    Total Protein 06/13/2025 6.9  6.3 - 8.2 g/dL Final    Albumin 06/13/2025 3.9  3.5 - 5.0 g/dL Final    Globulin 06/13/2025 3.1  2.3 - 3.5 g/dL Final    Albumin/Globulin Ratio 06/13/2025 1.3  1.0 - 1.9   Final    Ferritin 06/13/2025 13  8 - 388 NG/ML Final    Iron 06/13/2025 43  35 - 100 ug/dL Final    TIBC 06/13/2025 414  240 - 450 ug/dL Final    Iron % Saturation 06/13/2025 10 (L)  20 - 50 % Final    UIBC 06/13/2025 371.0 (H)  112.0 - 347.0 ug/dL Final                 Please refer to After Visit Summary or MyChart for upcoming appointment information. If you have any questions regarding your upcoming schedule please reach

## 2025-07-15 ENCOUNTER — OFFICE VISIT (OUTPATIENT)
Dept: FAMILY MEDICINE CLINIC | Facility: CLINIC | Age: 55
End: 2025-07-15
Payer: COMMERCIAL

## 2025-07-15 VITALS
BODY MASS INDEX: 31.23 KG/M2 | DIASTOLIC BLOOD PRESSURE: 78 MMHG | SYSTOLIC BLOOD PRESSURE: 118 MMHG | TEMPERATURE: 97.3 F | WEIGHT: 199 LBS | RESPIRATION RATE: 16 BRPM | HEART RATE: 69 BPM | OXYGEN SATURATION: 97 % | HEIGHT: 67 IN

## 2025-07-15 DIAGNOSIS — E78.00 ELEVATED LDL CHOLESTEROL LEVEL: ICD-10-CM

## 2025-07-15 DIAGNOSIS — Z00.00 ENCOUNTER FOR WELL ADULT EXAM WITHOUT ABNORMAL FINDINGS: Primary | ICD-10-CM

## 2025-07-15 DIAGNOSIS — R73.03 PREDIABETES: ICD-10-CM

## 2025-07-15 DIAGNOSIS — E66.811 OBESITY (BMI 30.0-34.9): ICD-10-CM

## 2025-07-15 DIAGNOSIS — Z13.6 ENCOUNTER FOR LIPID SCREENING FOR CARDIOVASCULAR DISEASE: ICD-10-CM

## 2025-07-15 DIAGNOSIS — Z12.11 ENCOUNTER FOR COLORECTAL CANCER SCREENING USING COLOGUARD TEST: ICD-10-CM

## 2025-07-15 DIAGNOSIS — R53.83 TIREDNESS: ICD-10-CM

## 2025-07-15 DIAGNOSIS — Z13.220 ENCOUNTER FOR LIPID SCREENING FOR CARDIOVASCULAR DISEASE: ICD-10-CM

## 2025-07-15 DIAGNOSIS — Z12.12 ENCOUNTER FOR COLORECTAL CANCER SCREENING USING COLOGUARD TEST: ICD-10-CM

## 2025-07-15 LAB
CHOLEST SERPL-MCNC: 176 MG/DL (ref 0–200)
EST. AVERAGE GLUCOSE BLD GHB EST-MCNC: 116 MG/DL
HBA1C MFR BLD: 5.7 % (ref 0–5.6)
HDLC SERPL-MCNC: 44 MG/DL (ref 40–60)
HDLC SERPL: 4 (ref 0–5)
LDLC SERPL CALC-MCNC: 78 MG/DL (ref 0–100)
TRIGL SERPL-MCNC: 269 MG/DL (ref 0–150)
VLDLC SERPL CALC-MCNC: 54 MG/DL (ref 6–23)

## 2025-07-15 PROCEDURE — 99396 PREV VISIT EST AGE 40-64: CPT | Performed by: FAMILY MEDICINE

## 2025-07-15 PROCEDURE — 99214 OFFICE O/P EST MOD 30 MIN: CPT | Performed by: FAMILY MEDICINE

## 2025-07-15 RX ORDER — ATORVASTATIN CALCIUM 20 MG/1
20 TABLET, FILM COATED ORAL DAILY
Qty: 90 TABLET | Refills: 1 | Status: SHIPPED | OUTPATIENT
Start: 2025-07-15

## 2025-07-15 SDOH — ECONOMIC STABILITY: FOOD INSECURITY: WITHIN THE PAST 12 MONTHS, THE FOOD YOU BOUGHT JUST DIDN'T LAST AND YOU DIDN'T HAVE MONEY TO GET MORE.: NEVER TRUE

## 2025-07-15 SDOH — ECONOMIC STABILITY: FOOD INSECURITY: WITHIN THE PAST 12 MONTHS, YOU WORRIED THAT YOUR FOOD WOULD RUN OUT BEFORE YOU GOT MONEY TO BUY MORE.: NEVER TRUE

## 2025-07-15 ASSESSMENT — PATIENT HEALTH QUESTIONNAIRE - PHQ9
2. FEELING DOWN, DEPRESSED OR HOPELESS: NOT AT ALL
SUM OF ALL RESPONSES TO PHQ QUESTIONS 1-9: 0
1. LITTLE INTEREST OR PLEASURE IN DOING THINGS: NOT AT ALL
SUM OF ALL RESPONSES TO PHQ QUESTIONS 1-9: 0

## 2025-07-15 NOTE — PROGRESS NOTES
Well Adult Note  Name: Narciso Reyes Today’s Date: 7/15/2025   MRN: [id number] Sex: Male   Age: 55 y.o. Ethnicity:  /    : 1970 Race:  /       Narciso Reyes is here for a well adult exam.       Assessment & Plan   Encounter for well adult exam without abnormal findings      Assessment & Plan  Health maintenance:  - Most labs normal; low iron saturation managed by Dr. Asher.  - Cholesterol risk factor 5.7%, slightly above desired <5%; on simvastatin.  - Due for colonoscopy; BMI >30 indicating obesity; shingles vaccine up-to-date.  - Order lipid panel and A1c test; order Cologuard test.  - Advised balanced diet, continue current meds until lab results; adjust meds if cholesterol remains high.  - Advised breaks when fatigued, consume energy-boosting foods; multivitamin daily.  - Physical conditioning: daily stretches, walking 9790-8047 steps daily, start with 3000 steps if necessary.  - Weight loss through portion control and calorie restriction.  - ROM exercises for neck and back twice daily.  - Use earwax drops instead of Q-tips.  - Wear seatbelt, avoid texting while driving.    Prediabetes:  - Stable; order A1c test.  - Avoid sugary drinks, maintain balanced diet.    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  - Advised breaks, hydration, energy-boosting foods.  - If symptoms persist, consider thyroid and B12 tests.    Obesity:  - BMI >30; advised weight loss through portion control and calorie restriction.  - Reduce portion sizes by 10-20%.  - Regular physical activity, walking 4624-1479 steps daily.    Elevated LDL cholesterol  Given his elevation in cholesterol levels based on his last test results, we will switch him from simvastatin to atorvastatin 20 mg  Lab Results   Component Value Date    CHOL 203 (H) 2024    TRIG 174 (H) 2024    HDL 52 2024     (H) 2024    VLDL 35 (H) 2024

## 2025-07-15 NOTE — CONSULTS
Session ID: [id number]  Session Duration: Longer than 54 minutes  Language: English   ID: #[id number]   Name: Waylon

## 2025-07-25 ENCOUNTER — HOSPITAL ENCOUNTER (OUTPATIENT)
Dept: LAB | Age: 55
Discharge: HOME | End: 2025-07-25
Payer: COMMERCIAL

## 2025-07-25 DIAGNOSIS — D45 POLYCYTHEMIA VERA (HCC): ICD-10-CM

## 2025-07-25 LAB
ALBUMIN SERPL-MCNC: 3.8 G/DL (ref 3.5–5)
ALBUMIN/GLOB SERPL: 1.3 (ref 1–1.9)
ALP SERPL-CCNC: 79 U/L (ref 40–129)
ALT SERPL-CCNC: 22 U/L (ref 8–55)
ANION GAP SERPL CALC-SCNC: 8 MMOL/L (ref 7–16)
AST SERPL-CCNC: 22 U/L (ref 15–37)
BASOPHILS # BLD: 0.08 K/UL (ref 0–0.2)
BASOPHILS NFR BLD: 1.2 % (ref 0–2)
BILIRUB SERPL-MCNC: 0.4 MG/DL (ref 0–1.2)
BUN SERPL-MCNC: 15 MG/DL (ref 6–23)
CALCIUM SERPL-MCNC: 9 MG/DL (ref 8.8–10.2)
CHLORIDE SERPL-SCNC: 107 MMOL/L (ref 98–107)
CO2 SERPL-SCNC: 25 MMOL/L (ref 20–29)
CREAT SERPL-MCNC: 0.79 MG/DL (ref 0.8–1.3)
DIFFERENTIAL METHOD BLD: ABNORMAL
EOSINOPHIL # BLD: 0.19 K/UL (ref 0–0.8)
EOSINOPHIL NFR BLD: 2.9 % (ref 0.5–7.8)
ERYTHROCYTE [DISTWIDTH] IN BLOOD BY AUTOMATED COUNT: 21.7 % (ref 11.9–14.6)
FERRITIN SERPL-MCNC: 11 NG/ML (ref 8–388)
GLOBULIN SER CALC-MCNC: 3 G/DL (ref 2.3–3.5)
GLUCOSE SERPL-MCNC: 105 MG/DL (ref 70–99)
HCT VFR BLD AUTO: 44.7 % (ref 41.1–50.3)
HGB BLD-MCNC: 13.4 G/DL (ref 13.6–17.2)
IMM GRANULOCYTES # BLD AUTO: 0.03 K/UL (ref 0–0.5)
IMM GRANULOCYTES NFR BLD AUTO: 0.5 % (ref 0–5)
IRON SATN MFR SERPL: 7 % (ref 20–50)
IRON SERPL-MCNC: 28 UG/DL (ref 35–100)
LYMPHOCYTES # BLD: 1.58 K/UL (ref 0.5–4.6)
LYMPHOCYTES NFR BLD: 24 % (ref 13–44)
MCH RBC QN AUTO: 21.4 PG (ref 26.1–32.9)
MCHC RBC AUTO-ENTMCNC: 30 G/DL (ref 31.4–35)
MCV RBC AUTO: 71.3 FL (ref 82–102)
MONOCYTES # BLD: 0.35 K/UL (ref 0.1–1.3)
MONOCYTES NFR BLD: 5.3 % (ref 4–12)
NEUTS SEG # BLD: 4.36 K/UL (ref 1.7–8.2)
NEUTS SEG NFR BLD: 66.1 % (ref 43–78)
NRBC # BLD: 0 K/UL (ref 0–0.2)
PLATELET # BLD AUTO: 580 K/UL (ref 150–450)
PMV BLD AUTO: 9.2 FL (ref 9.4–12.3)
POTASSIUM SERPL-SCNC: 4.3 MMOL/L (ref 3.5–5.1)
PROT SERPL-MCNC: 6.8 G/DL (ref 6.3–8.2)
RBC # BLD AUTO: 6.27 M/UL (ref 4.23–5.6)
SODIUM SERPL-SCNC: 140 MMOL/L (ref 136–145)
TIBC SERPL-MCNC: 398 UG/DL (ref 240–450)
UIBC SERPL-MCNC: 370 UG/DL (ref 112–347)
WBC # BLD AUTO: 6.6 K/UL (ref 4.3–11.1)

## 2025-07-25 PROCEDURE — 83540 ASSAY OF IRON: CPT

## 2025-07-25 PROCEDURE — 85025 COMPLETE CBC W/AUTO DIFF WBC: CPT

## 2025-07-25 PROCEDURE — 36415 COLL VENOUS BLD VENIPUNCTURE: CPT

## 2025-07-25 PROCEDURE — 83550 IRON BINDING TEST: CPT

## 2025-07-25 PROCEDURE — 80053 COMPREHEN METABOLIC PANEL: CPT

## 2025-07-25 PROCEDURE — 82728 ASSAY OF FERRITIN: CPT

## 2025-07-29 ENCOUNTER — TELEPHONE (OUTPATIENT)
Dept: ONCOLOGY | Age: 55
End: 2025-07-29

## 2025-07-29 NOTE — TELEPHONE ENCOUNTER
Call to pt with  [facility] #[id number] to let pt know that he does not need phlebotomy at this time per MD's review of his labs.  Confirmed upcoming appt date with patient.  He VU and appreciated call.

## 2025-08-26 ENCOUNTER — COMMUNITY OUTREACH (OUTPATIENT)
Dept: FAMILY MEDICINE CLINIC | Facility: CLINIC | Age: 55
End: 2025-08-26

## 2025-08-27 ENCOUNTER — TELEPHONE (OUTPATIENT)
Dept: ONCOLOGY | Age: 55
End: 2025-08-27